# Patient Record
Sex: FEMALE | Race: WHITE | NOT HISPANIC OR LATINO | Employment: OTHER | URBAN - METROPOLITAN AREA
[De-identification: names, ages, dates, MRNs, and addresses within clinical notes are randomized per-mention and may not be internally consistent; named-entity substitution may affect disease eponyms.]

---

## 2017-06-15 ENCOUNTER — OFFICE VISIT (OUTPATIENT)
Dept: INTERNAL MEDICINE | Facility: CLINIC | Age: 67
End: 2017-06-15
Payer: COMMERCIAL

## 2017-06-15 VITALS
DIASTOLIC BLOOD PRESSURE: 84 MMHG | BODY MASS INDEX: 19.94 KG/M2 | HEIGHT: 65 IN | TEMPERATURE: 97.8 F | WEIGHT: 119.7 LBS | HEART RATE: 116 BPM | OXYGEN SATURATION: 98 % | SYSTOLIC BLOOD PRESSURE: 128 MMHG

## 2017-06-15 DIAGNOSIS — F33.0 MILD EPISODE OF RECURRENT MAJOR DEPRESSIVE DISORDER (H): ICD-10-CM

## 2017-06-15 DIAGNOSIS — Z12.11 COLON CANCER SCREENING: ICD-10-CM

## 2017-06-15 DIAGNOSIS — Z13.6 CARDIOVASCULAR SCREENING; LDL GOAL LESS THAN 130: ICD-10-CM

## 2017-06-15 DIAGNOSIS — Z23 NEED FOR PNEUMOCOCCAL VACCINATION: Primary | ICD-10-CM

## 2017-06-15 DIAGNOSIS — Z23 NEED FOR TD VACCINE: ICD-10-CM

## 2017-06-15 DIAGNOSIS — Z12.31 VISIT FOR SCREENING MAMMOGRAM: ICD-10-CM

## 2017-06-15 LAB
ALBUMIN SERPL-MCNC: 4.5 G/DL (ref 3.4–5)
ALP SERPL-CCNC: 78 U/L (ref 40–150)
ALT SERPL W P-5'-P-CCNC: 22 U/L (ref 0–50)
ANION GAP SERPL CALCULATED.3IONS-SCNC: 10 MMOL/L (ref 3–14)
AST SERPL W P-5'-P-CCNC: 19 U/L (ref 0–45)
BILIRUB SERPL-MCNC: 0.5 MG/DL (ref 0.2–1.3)
BUN SERPL-MCNC: 10 MG/DL (ref 7–30)
CALCIUM SERPL-MCNC: 9.9 MG/DL (ref 8.5–10.1)
CHLORIDE SERPL-SCNC: 105 MMOL/L (ref 94–109)
CHOLEST SERPL-MCNC: 173 MG/DL
CO2 SERPL-SCNC: 25 MMOL/L (ref 20–32)
CREAT SERPL-MCNC: 0.73 MG/DL (ref 0.52–1.04)
GFR SERPL CREATININE-BSD FRML MDRD: 80 ML/MIN/1.7M2
GLUCOSE SERPL-MCNC: 111 MG/DL (ref 70–99)
HDLC SERPL-MCNC: 76 MG/DL
HGB BLD-MCNC: 14 G/DL (ref 11.7–15.7)
LDLC SERPL CALC-MCNC: 85 MG/DL
NONHDLC SERPL-MCNC: 97 MG/DL
POTASSIUM SERPL-SCNC: 4.4 MMOL/L (ref 3.4–5.3)
PROT SERPL-MCNC: 7.5 G/DL (ref 6.8–8.8)
SODIUM SERPL-SCNC: 140 MMOL/L (ref 133–144)
TRIGL SERPL-MCNC: 62 MG/DL

## 2017-06-15 PROCEDURE — G0009 ADMIN PNEUMOCOCCAL VACCINE: HCPCS | Performed by: INTERNAL MEDICINE

## 2017-06-15 PROCEDURE — 85018 HEMOGLOBIN: CPT | Performed by: INTERNAL MEDICINE

## 2017-06-15 PROCEDURE — 90472 IMMUNIZATION ADMIN EACH ADD: CPT | Performed by: INTERNAL MEDICINE

## 2017-06-15 PROCEDURE — 36415 COLL VENOUS BLD VENIPUNCTURE: CPT | Performed by: INTERNAL MEDICINE

## 2017-06-15 PROCEDURE — 99202 OFFICE O/P NEW SF 15 MIN: CPT | Mod: 25 | Performed by: INTERNAL MEDICINE

## 2017-06-15 PROCEDURE — 90714 TD VACC NO PRESV 7 YRS+ IM: CPT | Performed by: INTERNAL MEDICINE

## 2017-06-15 PROCEDURE — 80053 COMPREHEN METABOLIC PANEL: CPT | Performed by: INTERNAL MEDICINE

## 2017-06-15 PROCEDURE — 90670 PCV13 VACCINE IM: CPT | Performed by: INTERNAL MEDICINE

## 2017-06-15 PROCEDURE — 80061 LIPID PANEL: CPT | Performed by: INTERNAL MEDICINE

## 2017-06-15 RX ORDER — CIPROFLOXACIN AND DEXAMETHASONE 3; 1 MG/ML; MG/ML
2 SUSPENSION/ DROPS AURICULAR (OTIC)
COMMUNITY
End: 2019-09-19

## 2017-06-15 NOTE — LETTER
[facility]  [address]  [address]      Mehreen 15, 2017      Faustina STANLEY Card  [address] [facility] [address]          Dear Faustina,      I have enclosed a copy of your most recent labs done here at the clinic and if available some of your prior labs for comparison.     I am pleased to inform you that your routine blood work including your hemoglobin, sodium, potassium, calcium, kidney and liver function tests are all normal.    Your cholesterol looks good and I would not change anything at this point but would repeat your labs in 12 months.    Your blood sugar function tests are slightly abnormal and elevated and should be rechecked here in the clinic in 6 months with a follow-up visit with me, fasting.  I will look forward to seeing you at that time and please call to make an appointment.  In the meantime, please work on your diet limiting your carbohydrates and getting some good, regular exercise.    Please call me if you have further questions.        Gurdeep Bardales MD

## 2017-06-15 NOTE — MR AVS SNAPSHOT
After Visit Summary   6/15/2017    Faustina Ahmadi    MRN: [id number]           Patient Information     Date Of Birth          1950        Visit Information        Provider Department      6/15/2017 1:40 PM Gurdeep Bardales MD [facility]        Today's Diagnoses     Need for pneumococcal vaccination    -  1    Need for TD vaccine        Mild episode of recurrent major depressive disorder (H)        Colon cancer screening        CARDIOVASCULAR SCREENING; LDL GOAL LESS THAN 130        Visit for screening mammogram           Follow-ups after your visit        Additional Services     GASTROENTEROLOGY ADULT REF PROCEDURE ONLY       Last Lab Result: Creatinine (mg/dL)       Date                     Value                 04/23/2005               0.65             ----------  Body mass index is 19.92 kg/(m^2).     Needed:  No  Language:  English    Patient will be contacted to schedule procedure.     Please be aware that coverage of these services is subject to the terms and limitations of your health insurance plan.  Call member services at your health plan with any benefit or coverage questions.  Any procedures must be performed at a [place] facility OR coordinated by your clinic's referral office.    Please bring the following with you to your appointment:    (1) Any X-Rays, CTs or MRIs which have been performed.  Contact the facility where they were done to arrange for  prior to your scheduled appointment.    (2) List of current medications   (3) This referral request   (4) Any documents/labs given to you for this referral                  Future tests that were ordered for you today     Open Future Orders        Priority Expected Expires Ordered    *MA Screening Digital Bilateral Routine  6/15/2018 6/15/2017    Fecal colorectal cancer screen (FIT) Routine 7/6/2017 9/7/2017 6/15/2017            Who to contact     If you have questions or need follow up  "information about today's clinic visit or your schedule please contact [facility] directly at [phone].  Normal or non-critical lab and imaging results will be communicated to you by DemystData[employer], letter or phone within 4 business days after the clinic has received the results. If you do not hear from us within 7 days, please contact the clinic through DemystData[employer] or phone. If you have a critical or abnormal lab result, we will notify you by phone as soon as possible.  Submit refill requests through [employer] or call your pharmacy and they will forward the refill request to us. Please allow 3 business days for your refill to be completed.          Additional Information About Your Visit        [employer] Information     [employer] lets you send messages to your doctor, view your test results, renew your prescriptions, schedule appointments and more. To sign up, go to [email or web address] . Click on \"Log in\" on the left side of the screen, which will take you to the Welcome page. Then click on \"Sign up Now\" on the right side of the page.     You will be asked to enter the access code listed below, as well as some personal information. Please follow the directions to create your username and password.     Your access code is: [email or web address]  Expires: 2017  1:49 PM     Your access code will  in 90 days. If you need help or a new code, please call your [place] clinic or [phone].        Care EveryWhere ID     This is your Care EveryWhere ID. This could be used by other organizations to access your [place] medical records  [id number]        Your Vitals Were     Pulse Temperature Height Pulse Oximetry Breastfeeding? BMI (Body Mass Index)    116 97.8  F (36.6  C) (Oral) 5' 5\" (1.651 m) 98% No 19.92 kg/m2       Blood Pressure from Last 3 Encounters:   06/15/17 128/84   05 132/68   05 118/70    Weight from Last 3 Encounters:   06/15/17 119 lb 11.2 oz (54.3 kg)   05 122 lb " (55.3 kg)   05 120 lb (54.4 kg)              We Performed the Following     ADMIN MEDICARE: Pneumococcal Vaccine ()     Comprehensive metabolic panel     DEPRESSION ACTION PLAN (DAP)     GASTROENTEROLOGY ADULT REF PROCEDURE ONLY     Hemoglobin     Lipid Profile     MIGRAINE ACTION PLAN     Pneumococcal vaccine 13 valent PCV13 IM (Prevnar) [93041]     TD (ADULT, 7+) PRESERVE FREE        Primary Care Provider Office Phone # Fax #    Pb Birch [phone]       XXX  XXX [address]  [facility] [address]        Thank you!     Thank you for choosing [facility]  for your care. Our goal is always to provide you with excellent care. Hearing back from our patients is one way we can continue to improve our services. Please take a few minutes to complete the written survey that you may receive in the mail after your visit with us. Thank you!             Your Updated Medication List - Protect others around you: Learn how to safely use, store and throw away your medicines at www.disposemymeds.org.          This list is accurate as of: 6/15/17  1:49 PM.  Always use your most recent med list.                   Brand Name Dispense Instructions for use    ciprofloxacin-dexamethasone otic suspension    CIPRODEX     2 drops       EXCEDRIN MIGRAINE 250-250-65 MG per tablet   Generic drug:  aspirin-acetaminophen-caffeine     0    1 TABLET EVERY 4 HOURS AS NEEDED       REMERON 15 MG tablet   Generic drug:  mirtazapine     0    take 1/2 tablet daily @ bedtime

## 2017-06-15 NOTE — NURSING NOTE
"Chief Complaint   Patient presents with     Establish Care       Initial /84  Pulse 116  Temp 97.8  F (36.6  C) (Oral)  Ht 5' 5\" (1.651 m)  Wt 119 lb 11.2 oz (54.3 kg)  SpO2 98%  Breastfeeding? No  BMI 19.92 kg/m2 Estimated body mass index is 19.92 kg/(m^2) as calculated from the following:    Height as of this encounter: 5' 5\" (1.651 m).    Weight as of this encounter: 119 lb 11.2 oz (54.3 kg).  Medication Reconciliation: complete   Yael Huizar, ANTONIETTA      "

## 2017-06-15 NOTE — PROGRESS NOTES
SUBJECTIVE:                                                    Faustina Ahmadi is a 66 year old female who presents to clinic today for the following health issues:    TD- DUE  Prevnar- DUE   Mammo- DUE  Colonoscopy- DUE    New Patient/Transfer of Care- establish care, pt not seen for routine care in 10+ yrs. No specific concerns today.     Problem list and histories reviewed & adjusted, as indicated.  Additional history: as documented    Patient Active Problem List   Diagnosis     Mild episode of recurrent major depressive disorder (H)     Other type of migraine     CARDIOVASCULAR SCREENING; LDL GOAL LESS THAN 130     Past Surgical History:   Procedure Laterality Date     NO HISTORY OF SURGERY         Social History   Substance Use Topics     Smoking status: Former Smoker     Types: Cigarettes     Smokeless tobacco: Not on file     Alcohol use No     History reviewed. No pertinent family history.      Current Outpatient Prescriptions   Medication Sig Dispense Refill     ciprofloxacin-dexamethasone (CIPRODEX) otic suspension 2 drops       REMERON 15 MG OR TABS take 1/2 tablet daily @ bedtime 0 0     EXCEDRIN MIGRAINE 250-250-65 MG OR TABS 1 TABLET EVERY 4 HOURS AS NEEDED 0 0     No lab results found.   BP Readings from Last 3 Encounters:   06/15/17 128/84   04/21/05 132/68   04/20/05 118/70    Wt Readings from Last 3 Encounters:   06/15/17 119 lb 11.2 oz (54.3 kg)   04/21/05 122 lb (55.3 kg)   04/20/05 120 lb (54.4 kg)               Reviewed and updated as needed this visit by clinical staff  Tobacco  Allergies  Meds  Med Hx  Surg Hx  Fam Hx  Soc Hx      Reviewed and updated as needed this visit by Provider       ROS:  C: NEGATIVE for fever, chills, change in weight  E/M: NEGATIVE for ear, mouth and throat problems  R: NEGATIVE for significant cough or SOB  CV: NEGATIVE for chest pain, palpitations or peripheral edema  GI: NEGATIVE for nausea, abdominal pain, heartburn, or change in bowel habits  : NEGATIVE  "for frequency, dysuria, or hematuria  H: NEGATIVE for bleeding problems    OBJECTIVE:                                                    /84  Pulse 116  Temp 97.8  F (36.6  C) (Oral)  Ht 5' 5\" (1.651 m)  Wt 119 lb 11.2 oz (54.3 kg)  SpO2 98%  Breastfeeding? No  BMI 19.92 kg/m2  Body mass index is 19.92 kg/(m^2).  Mild Kot[place]  Mild Tic upper torso.       ASSESSMENT/PLAN:                                                    (Z23) Need for pneumococcal vaccination  (primary encounter diagnosis)  Comment: suggest update  Plan: Pneumococcal vaccine 13 valent PCV13 IM         (Prevnar) [68873], ADMIN MEDICARE: Pneumococcal        Vaccine ()            (Z23) Need for TD vaccine  Comment: suggest updated  Plan: TD (ADULT, 7+) PRESERVE FREE            (F33.0) Mild episode of recurrent major depressive disorder (H)  Comment: stable on therapy pr PHQ 9  Plan: Hemoglobin            (Z12.11) Colon cancer screening  Comment: ADVISED COLONOSCOPY FOR ROUTINE PREVENTATIVE CARE.  Plan: GASTROENTEROLOGY ADULT REF PROCEDURE ONLY,         Fecal colorectal cancer screen (FIT)            (Z13.6) CARDIOVASCULAR SCREENING; LDL GOAL LESS THAN 130  Comment: labs as fasting  Plan: Comprehensive metabolic panel, Lipid Profile            (Z12.31) Visit for screening mammogram  Comment: ordered as screening  Plan: *MA Screening Digital Bilateral            See Patient Instructions    Gurdeep Bardales MD  [facility]    THE MEDICATION LIST HAS BEEN FULLY RECONCILED BY THE M.D. AND THE NURSING STAFF.      "

## 2017-06-15 NOTE — LETTER
My Depression Action Plan  Name: Faustina Ahmadi   Date of Birth 1950  Date: 6/15/2017    My doctor: Pb Birch   My clinic: [address] [address] [phone]          GREEN    ZONE   Good Control    What it looks like:     Things are going generally well. You have normal up s and down s. You may even feel depressed from time to time, but bad moods usually last less than a day.   What you need to do:  1. Continue to care for yourself (see self care plan)  2. Check your depression survival kit and update it as needed  3. Follow your physician s recommendations including any medication.  4. Do not stop taking medication unless you consult with your physician first.           YELLOW         ZONE Getting Worse    What it looks like:     Depression is starting to interfere with your life.     It may be hard to get out of bed; you may be starting to isolate yourself from others.    Symptoms of depression are starting to last most all day and this has happened for several days.     You may have suicidal thoughts but they are not constant.   What you need to do:     1. Call your care team, your response to treatment will improve if you keep your care team informed of your progress. Yellow periods are signs an adjustment may need to be made.     2. Continue your self-care, even if you have to fake it!    3. Talk to someone in your support network    4. Open up your depression survival kit           RED    ZONE Medical Alert - Get Help    What it looks like:     Depression is seriously interfering with your life.     You may experience these or other symptoms: You can t get out of bed most days, can t work or engage in other necessary activities, you have trouble taking care of basic hygiene, or basic responsibilities, thoughts of suicide or death that will not go away, self-injurious behavior.     What you need to do:  1. Call your  care team and request a same-day appointment. If they are not available (weekends or after hours) call your local crisis line, emergency room or 911.      Electronically signed by: Yael Huizar, Mehreen 15, 2017    Depression Self Care Plan / Survival Kit    Self-Care for Depression  Here s the deal. Your body and mind are really not as separate as most people think.  What you do and think affects how you feel and how you feel influences what you do and think. This means if you do things that people who feel good do, it will help you feel better.  Sometimes this is all it takes.  There is also a place for medication and therapy depending on how severe your depression is, so be sure to consult with your medical provider and/ or Behavioral Health Consultant if your symptoms are worsening or not improving.     In order to better manage my stress, I will:    Exercise  Get some form of exercise, every day. This will help reduce pain and release endorphins, the  feel good  chemicals in your brain. This is almost as good as taking antidepressants!  This is not the same as joining a gym and then never going! (they count on that by the way ) It can be as simple as just going for a walk or doing some gardening, anything that will get you moving.      Hygiene   Maintain good hygiene (Get out of bed in the morning, Make your bed, Brush your teeth, Take a shower, and Get dressed like you were going to work, even if you are unemployed).  If your clothes don't fit try to get ones that do.    Diet  I will strive to eat foods that are good for me, drink plenty of water, and avoid excessive sugar, caffeine, alcohol, and other mood-altering substances.  Some foods that are helpful in depression are: complex carbohydrates, B vitamins, flaxseed, fish or fish oil, fresh fruits and vegetables.    Psychotherapy  I agree to participate in Individual Therapy (if recommended).    Medication  If prescribed medications, I agree to take them.   Missing doses can result in serious side effects.  I understand that drinking alcohol, or other illicit drug use, may cause potential side effects.  I will not stop my medication abruptly without first discussing it with my provider.    Staying Connected With Others  I will stay in touch with my friends, family members, and my primary care provider/team.    Use your imagination  Be creative.  We all have a creative side; it doesn t matter if it s oil painting, sand castles, or mud pies! This will also kick up the endorphins.    Witness Beauty  (AKA stop and smell the roses) Take a look outside, even in mid-winter. Notice colors, textures. Watch the squirrels and birds.     Service to others  Be of service to others.  There is always someone else in need.  By helping others we can  get out of ourselves  and remember the really important things.  This also provides opportunities for practicing all the other parts of the program.    Humor  Laugh and be silly!  Adjust your TV habits for less news and crime-drama and more comedy.    Control your stress  Try breathing deep, massage therapy, biofeedback, and meditation. Find time to relax each day.     My support system    Clinic Contact:  Phone number:    Contact 1:  Phone number:    Contact 2:  Phone number:    Oriental orthodox/:  Phone number:    Therapist:  Phone number:    Local crisis center:    Phone number:    Other community support:  Phone number:

## 2017-06-16 ASSESSMENT — PATIENT HEALTH QUESTIONNAIRE - PHQ9: SUM OF ALL RESPONSES TO PHQ QUESTIONS 1-9: 0

## 2017-06-17 DIAGNOSIS — Z12.11 COLON CANCER SCREENING: ICD-10-CM

## 2017-06-17 PROCEDURE — 82274 ASSAY TEST FOR BLOOD FECAL: CPT | Performed by: INTERNAL MEDICINE

## 2017-06-19 LAB — HEMOCCULT STL QL IA: POSITIVE

## 2017-06-27 ENCOUNTER — OFFICE VISIT (OUTPATIENT)
Dept: INTERNAL MEDICINE | Facility: CLINIC | Age: 67
End: 2017-06-27
Payer: COMMERCIAL

## 2017-06-27 VITALS
DIASTOLIC BLOOD PRESSURE: 82 MMHG | SYSTOLIC BLOOD PRESSURE: 144 MMHG | HEART RATE: 99 BPM | TEMPERATURE: 98.3 F | WEIGHT: 120.9 LBS | BODY MASS INDEX: 20.12 KG/M2 | OXYGEN SATURATION: 97 %

## 2017-06-27 DIAGNOSIS — R19.5 OCCULT BLOOD IN STOOLS: Primary | ICD-10-CM

## 2017-06-27 DIAGNOSIS — Z12.11 COLON CANCER SCREENING: ICD-10-CM

## 2017-06-27 DIAGNOSIS — Z71.89 COUNSELING REGARDING ADVANCED DIRECTIVES: ICD-10-CM

## 2017-06-27 PROCEDURE — 99213 OFFICE O/P EST LOW 20 MIN: CPT | Performed by: INTERNAL MEDICINE

## 2017-06-27 NOTE — MR AVS SNAPSHOT
After Visit Summary   6/27/2017    Faustina Ahmadi    MRN: [id number]           Patient Information     Date Of Birth          1950        Visit Information        Provider Department      6/27/2017 2:40 PM Gurdeep Bardales MD [facility]        Today's Diagnoses     Occult blood in stools    -  1    Counseling regarding advanced directives        Colon cancer screening           Follow-ups after your visit        Follow-up notes from your care team     Return if symptoms worsen or fail to improve.      Future tests that were ordered for you today     Open Future Orders        Priority Expected Expires Ordered    Fecal colorectal cancer screen (FIT) Routine 7/18/2017 9/19/2017 6/27/2017            Who to contact     If you have questions or need follow up information about today's clinic visit or your schedule please contact [facility] directly at [phone].  Normal or non-critical lab and imaging results will be communicated to you by Eduf[employer]hart, letter or phone within 4 business days after the clinic has received the results. If you do not hear from us within 7 days, please contact the clinic through Edufiihart or phone. If you have a critical or abnormal lab result, we will notify you by phone as soon as possible.  Submit refill requests through [employer] or call your pharmacy and they will forward the refill request to us. Please allow 3 business days for your refill to be completed.          Additional Information About Your Visit        MyChart Information     [employer] gives you secure access to your electronic health record. If you see a primary care provider, you can also send messages to your care team and make appointments. If you have questions, please call your primary care clinic.  If you do not have a primary care provider, please call [phone] and they will assist you.        Care EveryWhere ID     This is your Care EveryWhere ID. This  could be used by other organizations to access your [place] medical records  [id number]        Your Vitals Were     Pulse Temperature Pulse Oximetry BMI (Body Mass Index)          99 98.3  F (36.8  C) (Oral) 97% 20.12 kg/m2         Blood Pressure from Last 3 Encounters:   17 144/82   06/15/17 128/84   05 132/68    Weight from Last 3 Encounters:   17 120 lb 14.4 oz (54.8 kg)   06/15/17 119 lb 11.2 oz (54.3 kg)   05 122 lb (55.3 kg)               Primary Care Provider Office Phone # Fax #    Pb Birch [phone]       XXX  XXX [address]  [facility] [address]        Equal Access to Services     CASSIDY LU : Hadii aad ku hadasho Soomaali, waaxda luqadaha, qaybta kaalmada adeegyada, nishi reinoso hayellen askew . So [facility] [phone].    ATENCIÓN: Si habla español, tiene a guo disposición servicios gratuitos de asistencia lingüística. Narayan al [phone].    We comply with applicable federal civil rights laws and [place] laws. We do not discriminate on the basis of race, color, national origin, age, disability sex, sexual orientation or gender identity.            Thank you!     Thank you for choosing [facility]  for your care. Our goal is always to provide you with excellent care. Hearing back from our patients is one way we can continue to improve our services. Please take a few minutes to complete the written survey that you may receive in the mail after your visit with us. Thank you!             Your Updated Medication List - Protect others around you: Learn how to safely use, store and throw away your medicines at www.disposemymeds.org.          This list is accurate as of: 17  3:10 PM.  Always use your most recent med list.                   Brand Name Dispense Instructions for use Diagnosis    ciprofloxacin-dexamethasone otic suspension    CIPRODEX     2 drops        EXCEDRIN MIGRAINE 250-250-65 MG  per tablet   Generic drug:  aspirin-acetaminophen-caffeine     0    1 TABLET EVERY 4 HOURS AS NEEDED    Other forms of migraine, without mention of intractable migraine without mention of status migrainosus       REMERON 15 MG tablet   Generic drug:  mirtazapine     0    take 1/2 tablet daily @ bedtime    Depressive disorder, not elsewhere classified

## 2017-06-27 NOTE — PROGRESS NOTES
SUBJECTIVE:                                                    Faustina Ahmadi is a 66 year old female who presents to clinic today for the following health issues:    Here for discussion of recent labs done + occult stool:  Component      Latest Ref Rng & Units 6/15/2017 6/17/2017   Sodium      133 - 144 mmol/L 140    Potassium      3.4 - 5.3 mmol/L 4.4    Chloride      94 - 109 mmol/L 105    Carbon Dioxide      20 - 32 mmol/L 25    Anion Gap      3 - 14 mmol/L 10    Glucose      70 - 99 mg/dL 111 (H)    Urea Nitrogen      7 - 30 mg/dL 10    Creatinine      0.52 - 1.04 mg/dL 0.73    GFR Estimate      >60 mL/min/1.7m2 80    GFR Estimate If Black      >60 mL/min/1.7m2 >90 . . .    Calcium      8.5 - 10.1 mg/dL 9.9    Bilirubin Total      0.2 - 1.3 mg/dL 0.5    Albumin      3.4 - 5.0 g/dL 4.5    Protein Total      6.8 - 8.8 g/dL 7.5    Alkaline Phosphatase      40 - 150 U/L 78    ALT      0 - 50 U/L 22    AST      0 - 45 U/L 19    Cholesterol      <200 mg/dL 173    Triglycerides      <150 mg/dL 62    HDL Cholesterol      >49 mg/dL 76    LDL Cholesterol Calculated      <100 mg/dL 85    Non HDL Cholesterol      <130 mg/dL 97    Hemoglobin      11.7 - 15.7 g/dL 14.0    Occult Blood Scn FIT      NEG  Positive (A)         Problem list and histories reviewed & adjusted, as indicated.  Additional history: as documented    Patient Active Problem List   Diagnosis     Mild episode of recurrent major depressive disorder (H)     Other type of migraine     CARDIOVASCULAR SCREENING; LDL GOAL LESS THAN 130     Past Surgical History:   Procedure Laterality Date     NO HISTORY OF SURGERY         Social History   Substance Use Topics     Smoking status: Former Smoker     Types: Cigarettes     Smokeless tobacco: Not on file     Alcohol use No     No family history on file.      Current Outpatient Prescriptions   Medication Sig Dispense Refill     ciprofloxacin-dexamethasone (CIPRODEX) otic suspension 2 drops       REMERON 15 MG OR TABS  take 1/2 tablet daily @ bedtime 0 0     EXCEDRIN MIGRAINE 250-250-65 MG OR TABS 1 TABLET EVERY 4 HOURS AS NEEDED 0 0     Allergies   Allergen Reactions     Honey Rash     BP Readings from Last 3 Encounters:   06/15/17 128/84   04/21/05 132/68   04/20/05 118/70    Wt Readings from Last 3 Encounters:   06/15/17 119 lb 11.2 oz (54.3 kg)   04/21/05 122 lb (55.3 kg)   04/20/05 120 lb (54.4 kg)         Reviewed and updated as needed this visit by clinical staff       Reviewed and updated as needed this visit by Provider       ROS:  C: NEGATIVE for fever, chills, change in weight  E/M: NEGATIVE for ear, mouth and throat problems  R: NEGATIVE for significant cough or SOB  CV: NEGATIVE for chest pain, palpitations or peripheral edema  GI: NEGATIVE for nausea, abdominal pain, heartburn, or change in bowel habits  : NEGATIVE for frequency, dysuria, or hematuria  P: NEGATIVE for changes in mood or affect    OBJECTIVE:                                                    /82  Pulse 99  Temp 98.3  F (36.8  C) (Oral)  Wt 120 lb 14.4 oz (54.8 kg)  SpO2 97%  BMI 20.12 kg/m2  Body mass index is 20.12 kg/(m^2).  GENERAL: alert and no distress  RESP: lungs clear to auscultation - no rales, no rhonchi, no wheezes  CV: regular rates and rhythm, normal S1 S2, no S3 or S4 and no murmur, no click or rub -  PSYCH: Alert and oriented times 3; speech- coherent , normal rate and volume; able to articulate logical thoughts, able to abstract reason, no tangential thoughts, no hallucinations or delusions, affect- normal     ASSESSMENT/PLAN:                                                      (R19.5) Occult blood in stools  (primary encounter diagnosis)  Comment: as ordered  Plan: GASTROENTEROLOGY ADULT REF PROCEDURE ONLY for colonoscopy ordered, if negative repeat FIT, consider UGI assessment.          (Z71.89) Counseling regarding advanced directives  Comment: as ordered  Plan:       See Patient Instructions    Gurdeep Bardales,  MD  [facility]

## 2017-06-27 NOTE — NURSING NOTE
"Chief Complaint   Patient presents with     Occult Specimen       Initial /82  Pulse 99  Temp 98.3  F (36.8  C) (Oral)  Wt 120 lb 14.4 oz (54.8 kg)  SpO2 97%  BMI 20.12 kg/m2 Estimated body mass index is 20.12 kg/(m^2) as calculated from the following:    Height as of 6/15/17: 5' 5\" (1.651 m).    Weight as of this encounter: 120 lb 14.4 oz (54.8 kg).  Medication Reconciliation: complete   Yael Huizar CMA      "

## 2017-07-25 ENCOUNTER — TRANSFERRED RECORDS (OUTPATIENT)
Dept: HEALTH INFORMATION MANAGEMENT | Facility: CLINIC | Age: 67
End: 2017-07-25

## 2017-08-22 ENCOUNTER — TELEPHONE (OUTPATIENT)
Dept: INTERNAL MEDICINE | Facility: CLINIC | Age: 67
End: 2017-08-22

## 2017-08-22 NOTE — TELEPHONE ENCOUNTER
8/22/2017    Call Regarding Preventive Health Screening VIP Mammogram    Attempt 1    Message on voicemail     Comments: VIP Mammogram Patient      Outreach   NESTOR

## 2017-09-05 ENCOUNTER — RADIANT APPOINTMENT (OUTPATIENT)
Dept: MAMMOGRAPHY | Facility: CLINIC | Age: 67
End: 2017-09-05
Payer: COMMERCIAL

## 2017-09-05 DIAGNOSIS — Z12.31 VISIT FOR SCREENING MAMMOGRAM: ICD-10-CM

## 2017-09-05 PROCEDURE — G0202 SCR MAMMO BI INCL CAD: HCPCS | Mod: TC

## 2017-12-14 ENCOUNTER — OFFICE VISIT (OUTPATIENT)
Dept: INTERNAL MEDICINE | Facility: CLINIC | Age: 67
End: 2017-12-14
Payer: COMMERCIAL

## 2017-12-14 VITALS
TEMPERATURE: 98.5 F | OXYGEN SATURATION: 94 % | DIASTOLIC BLOOD PRESSURE: 80 MMHG | SYSTOLIC BLOOD PRESSURE: 136 MMHG | BODY MASS INDEX: 19.3 KG/M2 | HEART RATE: 133 BPM | WEIGHT: 116 LBS

## 2017-12-14 DIAGNOSIS — R73.9 HYPERGLYCEMIA: Primary | ICD-10-CM

## 2017-12-14 DIAGNOSIS — F33.0 MILD EPISODE OF RECURRENT MAJOR DEPRESSIVE DISORDER (H): ICD-10-CM

## 2017-12-14 LAB
ANION GAP SERPL CALCULATED.3IONS-SCNC: 9 MMOL/L (ref 3–14)
BUN SERPL-MCNC: 14 MG/DL (ref 7–30)
CALCIUM SERPL-MCNC: 9.7 MG/DL (ref 8.5–10.1)
CHLORIDE SERPL-SCNC: 107 MMOL/L (ref 94–109)
CO2 SERPL-SCNC: 24 MMOL/L (ref 20–32)
CREAT SERPL-MCNC: 0.75 MG/DL (ref 0.52–1.04)
GFR SERPL CREATININE-BSD FRML MDRD: 77 ML/MIN/1.7M2
GLUCOSE SERPL-MCNC: 111 MG/DL (ref 70–99)
POTASSIUM SERPL-SCNC: 4.6 MMOL/L (ref 3.4–5.3)
SODIUM SERPL-SCNC: 140 MMOL/L (ref 133–144)

## 2017-12-14 PROCEDURE — 36415 COLL VENOUS BLD VENIPUNCTURE: CPT | Performed by: INTERNAL MEDICINE

## 2017-12-14 PROCEDURE — 80048 BASIC METABOLIC PNL TOTAL CA: CPT | Performed by: INTERNAL MEDICINE

## 2017-12-14 PROCEDURE — 99213 OFFICE O/P EST LOW 20 MIN: CPT | Performed by: INTERNAL MEDICINE

## 2017-12-14 RX ORDER — MIRTAZAPINE 15 MG/1
TABLET, FILM COATED ORAL
Qty: 45 TABLET | Refills: 3
Start: 2017-12-14

## 2017-12-14 ASSESSMENT — PATIENT HEALTH QUESTIONNAIRE - PHQ9: SUM OF ALL RESPONSES TO PHQ QUESTIONS 1-9: 2

## 2017-12-14 NOTE — NURSING NOTE
"Chief Complaint   Patient presents with     Glucose     Occult Specimen       Initial /80  Pulse 133  Temp 98.5  F (36.9  C) (Oral)  Wt 116 lb (52.6 kg)  SpO2 94%  BMI 19.3 kg/m2 Estimated body mass index is 19.3 kg/(m^2) as calculated from the following:    Height as of 6/15/17: 5' 5\" (1.651 m).    Weight as of this encounter: 116 lb (52.6 kg).  Medication Reconciliation: complete   Yael Huizar CMA      "

## 2017-12-14 NOTE — MR AVS SNAPSHOT
After Visit Summary   12/14/2017    Faustina Ahmadi    MRN: [id number]           Patient Information     Date Of Birth          1950        Visit Information        Provider Department      12/14/2017 2:20 PM Gurdeep Bardales MD [facility]        Today's Diagnoses     Hyperglycemia    -  1    Mild episode of recurrent major depressive disorder (H)           Follow-ups after your visit        Follow-up notes from your care team     Return if symptoms worsen or fail to improve.      Who to contact     If you have questions or need follow up information about today's clinic visit or your schedule please contact [facility] directly at [phone].  Normal or non-critical lab and imaging results will be communicated to you by MyChart, letter or phone within 4 business days after the clinic has received the results. If you do not hear from us within 7 days, please contact the clinic through [employer] or phone. If you have a critical or abnormal lab result, we will notify you by phone as soon as possible.  Submit refill requests through [facility] or call your pharmacy and they will forward the refill request to us. Please allow 3 business days for your refill to be completed.          Additional Information About Your Visit        MyChart Information     [facility] gives you secure access to your electronic health record. If you see a primary care provider, you can also send messages to your care team and make appointments. If you have questions, please call your primary care clinic.  If you do not have a primary care provider, please call [phone] and they will assist you.        Care EveryWhere ID     This is your Care EveryWhere ID. This could be used by other organizations to access your [place] medical records  [id number]        Your Vitals Were     Pulse Temperature Pulse Oximetry BMI (Body Mass Index)          133 98.5  F (36.9  C) (Oral) 94% 19.3  kg/m2         Blood Pressure from Last 3 Encounters:   17 136/80   17 144/82   06/15/17 128/84    Weight from Last 3 Encounters:   17 116 lb (52.6 kg)   17 120 lb 14.4 oz (54.8 kg)   06/15/17 119 lb 11.2 oz (54.3 kg)              We Performed the Following     Basic metabolic panel          Today's Medication Changes          These changes are accurate as of: 17  2:45 PM.  If you have any questions, ask your nurse or doctor.               These medicines have changed or have updated prescriptions.        Dose/Directions    mirtazapine 15 MG tablet   Commonly known as:  REMERON   This may have changed:  See the new instructions.   Used for:  Mild episode of recurrent major depressive disorder (H)   Changed by:  Gurdeep Bardales MD        take 1/2 tablet daily @ bedtime   Quantity:  45 tablet   Refills:  3            Where to get your medicines      Some of these will need a paper prescription and others can be bought over the counter.  Ask your nurse if you have questions.     You don't need a prescription for these medications     mirtazapine 15 MG tablet                Primary Care Provider Office Phone # Fax #    Pb Birch,  [phone]       XXX  XXX [address]  [facility] [address]        Equal Access to Services     CASSIDY LU AH: Hadii aad ku hadasho Soomaali, waaxda luqadaha, qaybta kaalmada adeegyada, waxay idiin hayaan nelson youssef. So [facility] [phone].    ATENCIÓN: Si habla español, tiene a guo disposición servicios gratuitos de asistencia lingüística. Llame al [phone].    We comply with applicable federal civil rights laws and [place] laws. We do not discriminate on the basis of race, color, national origin, age, disability, sex, sexual orientation, or gender identity.            Thank you!     Thank you for choosing [facility]  for your care. Our goal is always to provide you with excellent  care. Hearing back from our patients is one way we can continue to improve our services. Please take a few minutes to complete the written survey that you may receive in the mail after your visit with us. Thank you!             Your Updated Medication List - Protect others around you: Learn how to safely use, store and throw away your medicines at www.disposemymeds.org.          This list is accurate as of: 12/14/17  2:45 PM.  Always use your most recent med list.                   Brand Name Dispense Instructions for use Diagnosis    ciprofloxacin-dexamethasone otic suspension    CIPRODEX     2 drops        EXCEDRIN MIGRAINE 250-250-65 MG per tablet   Generic drug:  aspirin-acetaminophen-caffeine     0    1 TABLET EVERY 4 HOURS AS NEEDED    Other forms of migraine, without mention of intractable migraine without mention of status migrainosus       mirtazapine 15 MG tablet    REMERON    45 tablet    take 1/2 tablet daily @ bedtime    Mild episode of recurrent major depressive disorder (H)

## 2017-12-14 NOTE — PROGRESS NOTES
SUBJECTIVE:   Faustina Ahmadi is a 67 year old female who presents to clinic today for the following health issues:    Follow up elevated glucose from 6/15/17 and positive FIT discussed as per prior colonoscopy.    Problem list and histories reviewed & adjusted, as indicated.  Additional history: as documented    Patient Active Problem List   Diagnosis     Mild episode of recurrent major depressive disorder (H)     Other type of migraine     CARDIOVASCULAR SCREENING; LDL GOAL LESS THAN 130     Counseling regarding advanced directives     Past Surgical History:   Procedure Laterality Date     NO HISTORY OF SURGERY         Social History   Substance Use Topics     Smoking status: Former Smoker     Types: Cigarettes     Smokeless tobacco: Never Used     Alcohol use No     History reviewed. No pertinent family history.      Current Outpatient Prescriptions   Medication Sig Dispense Refill     ciprofloxacin-dexamethasone (CIPRODEX) otic suspension 2 drops       REMERON 15 MG OR TABS take 1/2 tablet daily @ bedtime 0 0     EXCEDRIN MIGRAINE 250-250-65 MG OR TABS 1 TABLET EVERY 4 HOURS AS NEEDED 0 0     Allergies   Allergen Reactions     Honey Rash     BP Readings from Last 3 Encounters:   12/14/17 136/80   06/27/17 144/82   06/15/17 128/84    Wt Readings from Last 3 Encounters:   12/14/17 116 lb (52.6 kg)   06/27/17 120 lb 14.4 oz (54.8 kg)   06/15/17 119 lb 11.2 oz (54.3 kg)                 Reviewed and updated as needed this visit by clinical staffTobacco  Allergies  Med Hx  Surg Hx  Fam Hx  Soc Hx      Reviewed and updated as needed this visit by Provider         ROS:  C: NEGATIVE for fever, chills, change in weight  I: NEGATIVE for worrisome rashes, moles or lesions  E: NEGATIVE for vision changes or irritation  E/M: NEGATIVE for ear, mouth and throat problems  R: NEGATIVE for significant cough or SOB  B: NEGATIVE for masses, tenderness or discharge  CV: NEGATIVE for chest pain, palpitations or peripheral  edema  GI: NEGATIVE for nausea, abdominal pain, heartburn, or change in bowel habits  : NEGATIVE for frequency, dysuria, or hematuria  M: NEGATIVE for significant arthralgias or myalgia  N: NEGATIVE for weakness, dizziness or paresthesias  E: NEGATIVE for temperature intolerance, skin/hair changes  H: NEGATIVE for bleeding problems    OBJECTIVE:                                                    /80  Pulse 133  Temp 98.5  F (36.9  C) (Oral)  Wt 116 lb (52.6 kg)  SpO2 94%  BMI 19.3 kg/m2  Body mass index is 19.3 kg/(m^2).  GENERAL: healthy, alert and no distress  EYES: Eyes grossly normal to inspection, extraocular movements - intact, and PERRL  HENT: ear canals- normal; TMs- normal; Nose- normal; Mouth- no ulcers, no lesions  NECK: no tenderness, no adenopathy, no asymmetry, no masses, no stiffness; thyroid- normal to palpation  RESP: lungs clear to auscultation - no rales, no rhonchi, no wheezes  CV: regular rates and rhythm, normal S1 S2, no S3 or S4 and no murmur, no click or rub -  MS: extremities- no gross deformities noted, no edema  PSYCH: Alert and oriented times 3; speech- coherent , normal rate and volume; able to articulate logical thoughts, able to abstract reason, no tangential thoughts, no hallucinations or delusions, affect- normal       ASSESSMENT/PLAN:                                                      (R73.9) Hyperglycemia  (primary encounter diagnosis)  Comment: repeat fasting levels  Plan: Basic metabolic panel            (F33.0) Mild episode of recurrent major depressive disorder (H)  Comment: stable per PHQ 9 done score 2  Plan: mirtazapine (REMERON) 15 MG tablet          See Patient Instructions    Gurdeep Bardales MD  [facility]    THE MEDICATION LIST HAS BEEN FULLY RECONCILED BY THE M.D. AND THE NURSING STAFF.

## 2017-12-14 NOTE — LETTER
[facility][1]  [address] [address]  [phone]      12/14/2017       Faustina Ahmadi  [address] [facility] [address]        Dear Faustina,    Your blood sugar function tests are slightly abnormal and elevated and should be rechecked here in the clinic in 3 months with a follow-up visit with me, fasting.  I will look forward to seeing you at that time and please call to make an appointment.  In the meantime, please work on your diet limiting your carbohydrates and getting some good, regular exercise.    Sincerely,      Gurdeep Bardales MD  Internal Medicine

## 2018-03-14 ENCOUNTER — OFFICE VISIT (OUTPATIENT)
Dept: INTERNAL MEDICINE | Facility: CLINIC | Age: 68
End: 2018-03-14
Payer: COMMERCIAL

## 2018-03-14 VITALS
HEIGHT: 65 IN | WEIGHT: 115.6 LBS | SYSTOLIC BLOOD PRESSURE: 130 MMHG | RESPIRATION RATE: 15 BRPM | BODY MASS INDEX: 19.26 KG/M2 | DIASTOLIC BLOOD PRESSURE: 80 MMHG | TEMPERATURE: 97.6 F | HEART RATE: 106 BPM | OXYGEN SATURATION: 95 %

## 2018-03-14 DIAGNOSIS — R73.9 HYPERGLYCEMIA: ICD-10-CM

## 2018-03-14 DIAGNOSIS — Z78.0 ASYMPTOMATIC POSTMENOPAUSAL STATUS: Primary | ICD-10-CM

## 2018-03-14 DIAGNOSIS — R21 RASH: ICD-10-CM

## 2018-03-14 LAB
ANION GAP SERPL CALCULATED.3IONS-SCNC: 9 MMOL/L (ref 3–14)
BUN SERPL-MCNC: 15 MG/DL (ref 7–30)
CALCIUM SERPL-MCNC: 9.3 MG/DL (ref 8.5–10.1)
CHLORIDE SERPL-SCNC: 102 MMOL/L (ref 94–109)
CO2 SERPL-SCNC: 25 MMOL/L (ref 20–32)
CREAT SERPL-MCNC: 0.68 MG/DL (ref 0.52–1.04)
GFR SERPL CREATININE-BSD FRML MDRD: 87 ML/MIN/1.7M2
GLUCOSE SERPL-MCNC: 97 MG/DL (ref 70–99)
POTASSIUM SERPL-SCNC: 4 MMOL/L (ref 3.4–5.3)
SODIUM SERPL-SCNC: 136 MMOL/L (ref 133–144)

## 2018-03-14 PROCEDURE — 36415 COLL VENOUS BLD VENIPUNCTURE: CPT | Performed by: INTERNAL MEDICINE

## 2018-03-14 PROCEDURE — 99213 OFFICE O/P EST LOW 20 MIN: CPT | Performed by: INTERNAL MEDICINE

## 2018-03-14 PROCEDURE — 80048 BASIC METABOLIC PNL TOTAL CA: CPT | Performed by: INTERNAL MEDICINE

## 2018-03-14 RX ORDER — TACROLIMUS 1 MG/G
OINTMENT TOPICAL 2 TIMES DAILY
Qty: 30 G | Refills: 1 | Status: SHIPPED | OUTPATIENT
Start: 2018-03-14 | End: 2023-04-11

## 2018-03-14 RX ORDER — CLOBETASOL PROPIONATE 0.5 MG/G
OINTMENT TOPICAL 2 TIMES DAILY
Qty: 45 G | Refills: 1 | Status: SHIPPED | OUTPATIENT
Start: 2018-03-14 | End: 2019-09-19

## 2018-03-14 ASSESSMENT — PAIN SCALES - GENERAL: PAINLEVEL: NO PAIN (0)

## 2018-03-14 NOTE — NURSING NOTE
"Chief Complaint   Patient presents with     Glucose       Initial /80  Pulse 106  Temp 97.6  F (36.4  C) (Oral)  Resp 15  Ht 5' 5\" (1.651 m)  Wt 115 lb 9.6 oz (52.4 kg)  SpO2 91%  BMI 19.24 kg/m2 Estimated body mass index is 19.24 kg/(m^2) as calculated from the following:    Height as of this encounter: 5' 5\" (1.651 m).    Weight as of this encounter: 115 lb 9.6 oz (52.4 kg).  Medication Reconciliation: complete   Yael Huizar CMA      "

## 2018-03-14 NOTE — PROGRESS NOTES
SUBJECTIVE:   Faustina Ahmadi is a 67 year old female who presents to clinic today for the following health issues:    Recheck glucose from 12/14/18    Patient is also requesting refills of 2 topical medications she has received from dermatology and Dr. MOURA.  She has been using these on a as needed basis.    Problem list and histories reviewed & adjusted, as indicated.  Additional history: as documented    Patient Active Problem List   Diagnosis     Mild episode of recurrent major depressive disorder (H)     Other type of migraine     CARDIOVASCULAR SCREENING; LDL GOAL LESS THAN 130     Counseling regarding advanced directives     Past Surgical History:   Procedure Laterality Date     NO HISTORY OF SURGERY         Social History   Substance Use Topics     Smoking status: Former Smoker     Types: Cigarettes     Smokeless tobacco: Never Used     Alcohol use No     No family history on file.      Current Outpatient Prescriptions   Medication Sig Dispense Refill     mirtazapine (REMERON) 15 MG tablet take 1/2 tablet daily @ bedtime 45 tablet 3     ciprofloxacin-dexamethasone (CIPRODEX) otic suspension 2 drops       EXCEDRIN MIGRAINE 250-250-65 MG OR TABS 1 TABLET EVERY 4 HOURS AS NEEDED 0 0     Allergies   Allergen Reactions     Honey Rash     BP Readings from Last 3 Encounters:   12/14/17 136/80   06/27/17 144/82   06/15/17 128/84    Wt Readings from Last 3 Encounters:   12/14/17 116 lb (52.6 kg)   06/27/17 120 lb 14.4 oz (54.8 kg)   06/15/17 119 lb 11.2 oz (54.3 kg)                    Reviewed and updated as needed this visit by clinical staff       Reviewed and updated as needed this visit by Provider         ROS:  CONSTITUTIONAL: NEGATIVE for fever, chills, change in weight  INTEGUMENTARY/SKIN: NEGATIVE for worrisome rashes, moles or lesions  ENT/MOUTH: NEGATIVE for ear, mouth and throat problems  RESP: NEGATIVE for significant cough or SOB  CV: NEGATIVE for chest pain, palpitations or peripheral edema  GI:  "NEGATIVE for nausea, abdominal pain, heartburn, or change in bowel habits  : NEGATIVE for frequency, dysuria, or hematuria  MUSCULOSKELETAL: NEGATIVE for significant arthralgias or myalgia  HEME: NEGATIVE for bleeding problems  PSYCHIATRIC: NEGATIVE for changes in mood or affect    OBJECTIVE:                                                    /80  Pulse 106  Temp 97.6  F (36.4  C) (Oral)  Resp 15  Ht 5' 5\" (1.651 m)  Wt 115 lb 9.6 oz (52.4 kg)  SpO2 95%  BMI 19.24 kg/m2  Body mass index is 19.24 kg/(m^2).  GENERAL: healthy, alert and no distress  RESP: lungs clear to auscultation - no rales, no rhonchi, no wheezes  CV: regular rates and rhythm, normal S1 S2, no S3 or S4 and no click or rub   MS: extremities- no gross deformities noted  PSYCH: Alert and oriented times 3; speech- coherent , normal rate and volume; able to articulate logical thoughts, able to abstract reason, no tangential thoughts, no hallucinations or delusions, affect- normal       ASSESSMENT/PLAN:                                                      (Z78.0) Asymptomatic postmenopausal status  (primary encounter diagnosis)  Comment: Just at baseline assessment for bone density scan per age.  Plan: DX Hip/Pelvis/Spine            (R21) Rash  Comment: Refilled for as needed use  Plan: clobetasol (TEMOVATE) 0.05 % ointment,         tacrolimus (PROTOPIC) 0.1 % ointment            (R73.9) Hyperglycemia  Comment: Order also done for repeat basic metabolic panel due to fasting blood sugar slightly elevated per last check.  Plan: **Basic metabolic panel FUTURE anytime              See Patient Instructions    Gurdeep Bardales MD  [facility] [place]    THE MEDICATION LIST HAS BEEN FULLY RECONCILED BY THE M.D. AND THE NURSING STAFF.    "

## 2018-03-14 NOTE — MR AVS SNAPSHOT
After Visit Summary   3/14/2018    Faustina Ahmadi    MRN: [id number]           Patient Information     Date Of Birth          1950        Visit Information        Provider Department      3/14/2018 10:00 AM Gurdeep Bardales MD [facility]        Today's Diagnoses     Asymptomatic postmenopausal status    -  1    Rash        Hyperglycemia           Follow-ups after your visit        Future tests that were ordered for you today     Open Future Orders        Priority Expected Expires Ordered    **Basic metabolic panel FUTURE anytime Routine 3/14/2018 3/14/2019 3/14/2018    DX Hip/Pelvis/Spine Routine  3/14/2019 3/14/2018            Who to contact     If you have questions or need follow up information about today's clinic visit or your schedule please contact [facility] directly at [phone].  Normal or non-critical lab and imaging results will be communicated to you by MyChart, letter or phone within 4 business days after the clinic has received the results. If you do not hear from us within 7 days, please contact the clinic through MyChart or phone. If you have a critical or abnormal lab result, we will notify you by phone as soon as possible.  Submit refill requests through [employer] or call your pharmacy and they will forward the refill request to us. Please allow 3 business days for your refill to be completed.          Additional Information About Your Visit        MyChart Information     [employer] gives you secure access to your electronic health record. If you see a primary care provider, you can also send messages to your care team and make appointments. If you have questions, please call your primary care clinic.  If you do not have a primary care provider, please call [phone] and they will assist you.        Care EveryWhere ID     This is your Care EveryWhere ID. This could be used by other organizations to access your [facility]  "records  [id number]        Your Vitals Were     Pulse Temperature Respirations Height Pulse Oximetry BMI (Body Mass Index)    106 97.6  F (36.4  C) (Oral) 15 5' 5\" (1.651 m) 95% 19.24 kg/m2       Blood Pressure from Last 3 Encounters:   18 130/80   17 136/80   17 144/82    Weight from Last 3 Encounters:   18 115 lb 9.6 oz (52.4 kg)   17 116 lb (52.6 kg)   17 120 lb 14.4 oz (54.8 kg)                 Today's Medication Changes          These changes are accurate as of 3/14/18 10:14 AM.  If you have any questions, ask your nurse or doctor.               Start taking these medicines.        Dose/Directions    clobetasol 0.05 % ointment   Commonly known as:  TEMOVATE   Used for:  Rash   Started by:  Gurdeep Bardales MD        Apply topically 2 times daily   Quantity:  45 g   Refills:  1       tacrolimus 0.1 % ointment   Commonly known as:  PROTOPIC   Used for:  Rash   Started by:  Gurdeep Bardales MD        Apply topically 2 times daily   Quantity:  30 g   Refills:  1            Where to get your medicines      These medications were sent to [facility] [address] [place] [address] [address] AT [facility] [address] [facility] [address]    Hours:  24-hours Phone:  [phone]     clobetasol 0.05 % ointment    tacrolimus 0.1 % ointment                Primary Care Provider Office Phone # Fax #    Pb Birch [phone]       XXX  XXX [address]  [facility] [address]        Equal Access to Services     CASSIDY LU AH: Hadii madelin Zarate, waaxda luqadaha, qaybta kaalmada adeegyada, nishi youssef. So [facility] [phone].    ATENCIÓN: Si habla español, tiene a guo disposición servicios gratuitos de asistencia lingüística. Llame al [phone].    We comply with applicable federal civil rights laws and [place] laws. We do not discriminate on the basis of race, color, national " origin, age, disability, sex, sexual orientation, or gender identity.            Thank you!     Thank you for choosing [facility]  for your care. Our goal is always to provide you with excellent care. Hearing back from our patients is one way we can continue to improve our services. Please take a few minutes to complete the written survey that you may receive in the mail after your visit with us. Thank you!             Your Updated Medication List - Protect others around you: Learn how to safely use, store and throw away your medicines at www.disposemymeds.org.          This list is accurate as of 3/14/18 10:14 AM.  Always use your most recent med list.                   Brand Name Dispense Instructions for use Diagnosis    ciprofloxacin-dexamethasone otic suspension    CIPRODEX     2 drops        clobetasol 0.05 % ointment    TEMOVATE    45 g    Apply topically 2 times daily    Rash       EXCEDRIN MIGRAINE 250-250-65 MG per tablet   Generic drug:  aspirin-acetaminophen-caffeine     0    1 TABLET EVERY 4 HOURS AS NEEDED    Other forms of migraine, without mention of intractable migraine without mention of status migrainosus       mirtazapine 15 MG tablet    REMERON    45 tablet    take 1/2 tablet daily @ bedtime    Mild episode of recurrent major depressive disorder (H)       tacrolimus 0.1 % ointment    PROTOPIC    30 g    Apply topically 2 times daily    Rash

## 2018-03-15 ENCOUNTER — TELEPHONE (OUTPATIENT)
Dept: INTERNAL MEDICINE | Facility: CLINIC | Age: 68
End: 2018-03-15

## 2018-03-15 NOTE — TELEPHONE ENCOUNTER
Prior Authorization Retail Medication Request    Medication/Dose: clobetasol prop 0.05% oint 45gm  ICD code (if different than what is on RX):R21  Previously Tried and Failed:  Rationale:    Insurance Name: [employer]  Insurance ID: [id number]      Pharmacy Information (if different than what is on RX)  Name:[facility] [place], [place].  Phone: [phone] FAX: [phone]

## 2018-03-20 NOTE — TELEPHONE ENCOUNTER
Central Prior Authorization Team   Phone: [phone]      Prior Authorization Approval    Authorization Effective Date: 12/20/2017  Authorization Expiration Date: 3/20/2019  Medication: clobetasol prop 0.05% oint 45gm - Approved  Insurance Company: [employer] - Phone [phone] Fax [phone]  Expected CoPay: 158$      Which Pharmacy is filling the prescription (Not needed for infusion/clinic administered): [facility] [address] [place] [address] [address] AT [facility] [address] [facility]  Pharmacy Notified: Yes  Patient Notified: Yes

## 2018-04-18 ENCOUNTER — HOSPITAL ENCOUNTER (OUTPATIENT)
Dept: BONE DENSITY | Facility: CLINIC | Age: 68
Discharge: HOME | End: 2018-04-18
Attending: INTERNAL MEDICINE | Admitting: INTERNAL MEDICINE
Payer: MEDICARE

## 2018-04-18 DIAGNOSIS — Z78.0 ASYMPTOMATIC POSTMENOPAUSAL STATUS: ICD-10-CM

## 2018-04-18 PROCEDURE — 77080 DXA BONE DENSITY AXIAL: CPT

## 2018-09-05 DIAGNOSIS — Z12.31 VISIT FOR SCREENING MAMMOGRAM: ICD-10-CM

## 2018-09-05 PROCEDURE — 77067 SCR MAMMO BI INCL CAD: CPT | Mod: TC

## 2018-09-13 ENCOUNTER — OFFICE VISIT (OUTPATIENT)
Dept: INTERNAL MEDICINE | Facility: CLINIC | Age: 68
End: 2018-09-13
Payer: COMMERCIAL

## 2018-09-13 VITALS
DIASTOLIC BLOOD PRESSURE: 78 MMHG | RESPIRATION RATE: 16 BRPM | OXYGEN SATURATION: 99 % | WEIGHT: 121.1 LBS | HEART RATE: 109 BPM | SYSTOLIC BLOOD PRESSURE: 136 MMHG | TEMPERATURE: 98.4 F | BODY MASS INDEX: 20.15 KG/M2

## 2018-09-13 DIAGNOSIS — Z23 NEED FOR VACCINATION: ICD-10-CM

## 2018-09-13 DIAGNOSIS — R73.9 HYPERGLYCEMIA: ICD-10-CM

## 2018-09-13 DIAGNOSIS — M81.0 OSTEOPOROSIS, UNSPECIFIED OSTEOPOROSIS TYPE, UNSPECIFIED PATHOLOGICAL FRACTURE PRESENCE: Primary | ICD-10-CM

## 2018-09-13 DIAGNOSIS — Z11.59 NEED FOR HEPATITIS C SCREENING TEST: ICD-10-CM

## 2018-09-13 DIAGNOSIS — Z23 NEED FOR PROPHYLACTIC VACCINATION AGAINST STREPTOCOCCUS PNEUMONIAE (PNEUMOCOCCUS): ICD-10-CM

## 2018-09-13 PROCEDURE — 99213 OFFICE O/P EST LOW 20 MIN: CPT | Mod: 25 | Performed by: INTERNAL MEDICINE

## 2018-09-13 PROCEDURE — G0009 ADMIN PNEUMOCOCCAL VACCINE: HCPCS | Performed by: INTERNAL MEDICINE

## 2018-09-13 PROCEDURE — 90732 PPSV23 VACC 2 YRS+ SUBQ/IM: CPT | Performed by: INTERNAL MEDICINE

## 2018-09-13 NOTE — PROGRESS NOTES
SUBJECTIVE:   Faustina Ahmadi is a 67 year old female who presents to clinic today for the following health issues:    Patient here to follow up on appointment in March. When she had elevated glucose levels.  She also received my letter regards to her bone density scan.  She is also here to review her mammogram results.    Component      Latest Ref Rng & Units 6/15/2017 12/14/2017 3/14/2018   Sodium      133 - 144 mmol/L 140 140 136   Potassium      3.4 - 5.3 mmol/L 4.4 4.6 4.0   Chloride      94 - 109 mmol/L 105 107 102   Carbon Dioxide      20 - 32 mmol/L 25 24 25   Anion Gap      3 - 14 mmol/L 10 9 9   Glucose      70 - 99 mg/dL 111 (H) 111 (H) 97   Urea Nitrogen      7 - 30 mg/dL 10 14 15   Creatinine      0.52 - 1.04 mg/dL 0.73 0.75 0.68   GFR Estimate      >60 mL/min/1.7m2 80 77 87   GFR Estimate If Black      >60 mL/min/1.7m2 >90 . . . >90 >90   Calcium      8.5 - 10.1 mg/dL 9.9 9.7 9.3   Bilirubin Total      0.2 - 1.3 mg/dL 0.5     Albumin      3.4 - 5.0 g/dL 4.5     Protein Total      6.8 - 8.8 g/dL 7.5     Alkaline Phosphatase      40 - 150 U/L 78     ALT      0 - 50 U/L 22     AST      0 - 45 U/L 19         Problem list and histories reviewed & adjusted, as indicated.  Additional history: as documented    Patient Active Problem List   Diagnosis     Mild episode of recurrent major depressive disorder (H)     Other type of migraine     CARDIOVASCULAR SCREENING; LDL GOAL LESS THAN 130     Counseling regarding advanced directives     Past Surgical History:   Procedure Laterality Date     COLONOSCOPY  7/25/2017     NO HISTORY OF SURGERY         Social History   Substance Use Topics     Smoking status: Former Smoker     Types: Cigarettes     Smokeless tobacco: Never Used     Alcohol use No     Family History   Problem Relation Age of Onset     Diabetes Brother          Current Outpatient Prescriptions   Medication Sig Dispense Refill     ciprofloxacin-dexamethasone (CIPRODEX) otic suspension 2 drops        clobetasol (TEMOVATE) 0.05 % ointment Apply topically 2 times daily 45 g 1     EXCEDRIN MIGRAINE 250-250-65 MG OR TABS 1 TABLET EVERY 4 HOURS AS NEEDED 0 0     mirtazapine (REMERON) 15 MG tablet take 1/2 tablet daily @ bedtime 45 tablet 3     tacrolimus (PROTOPIC) 0.1 % ointment Apply topically 2 times daily 30 g 1     Allergies   Allergen Reactions     Honey Rash     BP Readings from Last 3 Encounters:   03/14/18 130/80   12/14/17 136/80   06/27/17 144/82    Wt Readings from Last 3 Encounters:   03/14/18 115 lb 9.6 oz (52.4 kg)   12/14/17 116 lb (52.6 kg)   06/27/17 120 lb 14.4 oz (54.8 kg)                    Reviewed and updated as needed this visit by clinical staff       Reviewed and updated as needed this visit by Provider         ROS:  CONSTITUTIONAL: NEGATIVE for fever, chills, change in weight  ENT/MOUTH: NEGATIVE for ear, mouth and throat problems  RESP: NEGATIVE for significant cough or SOB  CV: NEGATIVE for chest pain, palpitations or peripheral edema  GI: NEGATIVE for nausea, abdominal pain, heartburn, or change in bowel habits  : NEGATIVE for frequency, dysuria, or hematuria  MUSCULOSKELETAL: NEGATIVE for significant arthralgias or myalgia  HEME: NEGATIVE for bleeding problems  PSYCHIATRIC: NEGATIVE for changes in mood or affect    OBJECTIVE:                                                    /78 (BP Location: Left arm, Patient Position: Chair, Cuff Size: Adult Regular)  Pulse 109  Temp 98.4  F (36.9  C) (Oral)  Resp 16  Wt 121 lb 1.6 oz (54.9 kg)  SpO2 99%  BMI 20.15 kg/m2  Body mass index is 20.15 kg/(m^2).  GENERAL: healthy, alert and no distress  RESP: lungs clear to auscultation - no rales, no rhonchi, no wheezes  CV: regular rates and rhythm, normal S1 S2, no S3 or S4 and no click or rub   MS: extremities- no gross deformities noted, no edema  NEURO: strength and tone- normal, sensory exam- grossly normal, mentation- intact, speech- normal, reflexes- symmetric  PSYCH: Alert and  oriented times 3; speech- coherent , normal rate and volume; able to articulate logical thoughts, able to abstract reason, no tangential thoughts, no hallucinations or delusions, affect- normal     ASSESSMENT/PLAN:                                                      (M81.0) Osteoporosis, unspecified osteoporosis type, unspecified pathological fracture presence  (primary encounter diagnosis)  Comment: I discussed with the patient issues of her recent bone density scan and the fact that she has osteoporosis and I reviewed with her the options of treatment in addition to calcium and vitamin D which include a trial of a bisphosphonate.  Plan: She is not interested in doing such but will consider it and will continue with her calcium and vitamin D    (R73.9) Hyperglycemia  Comment: Recheck labs fasting and she will make a lab appoint  Plan: **Basic metabolic panel FUTURE anytime            (Z11.59) Need for hepatitis C screening test  Comment: Recommended routine screening accordingly  Plan: Hepatitis C Screen Reflex to HCV RNA Quant and         Genotype            (Z23) Need for prophylactic vaccination against Streptococcus pneumoniae (pneumococcus)  Comment: Recommended update pneumococcal vaccination  Plan:       See Patient Instructions    Gurdeep Bardales MD  [facility]    THE MEDICATION LIST HAS BEEN FULLY RECONCILED BY THE M.D. AND THE NURSING STAFF.

## 2018-09-13 NOTE — MR AVS SNAPSHOT
After Visit Summary   9/13/2018    Faustina Ahmadi    MRN: [id number]           Patient Information     Date Of Birth          1950        Visit Information        Provider Department      9/13/2018 2:20 PM Gurdeep Bardales MD [facility]        Today's Diagnoses     Osteoporosis, unspecified osteoporosis type, unspecified pathological fracture presence    -  1    Hyperglycemia        Need for hepatitis C screening test        Need for prophylactic vaccination against Streptococcus pneumoniae (pneumococcus)           Follow-ups after your visit        Future tests that were ordered for you today     Open Future Orders        Priority Expected Expires Ordered    Hepatitis C Screen Reflex to HCV RNA Quant and Genotype Routine 9/13/2018 9/30/2018 9/13/2018    **Basic metabolic panel FUTURE anytime Routine 9/13/2018 9/30/2018 9/13/2018            Who to contact     If you have questions or need follow up information about today's clinic visit or your schedule please contact [facility] directly at [phone].  Normal or non-critical lab and imaging results will be communicated to you by [employer], letter or phone within 4 business days after the clinic has received the results. If you do not hear from us within 7 days, please contact the clinic through [employer] or phone. If you have a critical or abnormal lab result, we will notify you by phone as soon as possible.  Submit refill requests through [employer] or call your pharmacy and they will forward the refill request to us. Please allow 3 business days for your refill to be completed.          Additional Information About Your Visit        MyChart Information     [employer] gives you secure access to your electronic health record. If you see a primary care provider, you can also send messages to your care team and make appointments. If you have questions, please call your primary care clinic.  If you do not  have a primary care provider, please call [phone] and they will assist you.        Care EveryWhere ID     This is your Care EveryWhere ID. This could be used by other organizations to access your [facility] medical records  [id number]        Your Vitals Were     Pulse Temperature Respirations Pulse Oximetry BMI (Body Mass Index)       109 98.4  F (36.9  C) (Oral) 16 99% 20.15 kg/m2        Blood Pressure from Last 3 Encounters:   18 136/78   18 130/80   17 136/80    Weight from Last 3 Encounters:   18 121 lb 1.6 oz (54.9 kg)   18 115 lb 9.6 oz (52.4 kg)   17 116 lb (52.6 kg)               Primary Care Provider Office Phone # Fax #    Pb Birch [phone]       XXX  XXX [address] [facility] [address]        Equal Access to Services     CASSIDY LU : Hadii aad ku hadasho Soomaali, waaxda luqadaha, qaybta kaalmada adeegyada, waxay idiin hayabeln nelson askew . So [facility] [phone].    ATENCIÓN: Si habla español, tiene a guo disposición servicios gratuitos de asistencia lingüística. [facility] [phone].    We comply with applicable federal civil rights laws and [place] laws. We do not discriminate on the basis of race, color, national origin, age, disability, sex, sexual orientation, or gender identity.            Thank you!     Thank you for choosing [facility]  for your care. Our goal is always to provide you with excellent care. Hearing back from our patients is one way we can continue to improve our services. Please take a few minutes to complete the written survey that you may receive in the mail after your visit with us. Thank you!             Your Updated Medication List - Protect others around you: Learn how to safely use, store and throw away your medicines at www.disposemymeds.org.          This list is accurate as of 18  2:25 PM.  Always use your most recent med list.                    Brand Name Dispense Instructions for use Diagnosis    ciprofloxacin-dexamethasone otic suspension    CIPRODEX     2 drops        clobetasol 0.05 % ointment    TEMOVATE    45 g    Apply topically 2 times daily    Rash       EXCEDRIN MIGRAINE 250-250-65 MG per tablet   Generic drug:  aspirin-acetaminophen-caffeine     0    1 TABLET EVERY 4 HOURS AS NEEDED    Other forms of migraine, without mention of intractable migraine without mention of status migrainosus       mirtazapine 15 MG tablet    REMERON    45 tablet    take 1/2 tablet daily @ bedtime    Mild episode of recurrent major depressive disorder (H)       tacrolimus 0.1 % ointment    PROTOPIC    30 g    Apply topically 2 times daily    Rash

## 2018-09-21 DIAGNOSIS — Z11.59 NEED FOR HEPATITIS C SCREENING TEST: ICD-10-CM

## 2018-09-21 DIAGNOSIS — R73.9 HYPERGLYCEMIA: ICD-10-CM

## 2018-09-21 LAB
ANION GAP SERPL CALCULATED.3IONS-SCNC: 9 MMOL/L (ref 3–14)
BUN SERPL-MCNC: 13 MG/DL (ref 7–30)
CALCIUM SERPL-MCNC: 9.4 MG/DL (ref 8.5–10.1)
CHLORIDE SERPL-SCNC: 102 MMOL/L (ref 94–109)
CO2 SERPL-SCNC: 24 MMOL/L (ref 20–32)
CREAT SERPL-MCNC: 0.74 MG/DL (ref 0.52–1.04)
GFR SERPL CREATININE-BSD FRML MDRD: 78 ML/MIN/1.7M2
GLUCOSE SERPL-MCNC: 67 MG/DL (ref 70–99)
POTASSIUM SERPL-SCNC: 4.8 MMOL/L (ref 3.4–5.3)
SODIUM SERPL-SCNC: 135 MMOL/L (ref 133–144)

## 2018-09-21 PROCEDURE — 36415 COLL VENOUS BLD VENIPUNCTURE: CPT | Performed by: INTERNAL MEDICINE

## 2018-09-21 PROCEDURE — 86803 HEPATITIS C AB TEST: CPT | Performed by: INTERNAL MEDICINE

## 2018-09-21 PROCEDURE — 80048 BASIC METABOLIC PNL TOTAL CA: CPT | Performed by: INTERNAL MEDICINE

## 2018-09-24 LAB — HCV AB SERPL QL IA: NONREACTIVE

## 2019-09-05 DIAGNOSIS — Z12.31 VISIT FOR SCREENING MAMMOGRAM: ICD-10-CM

## 2019-09-05 PROCEDURE — 77067 SCR MAMMO BI INCL CAD: CPT | Mod: TC

## 2019-09-19 ENCOUNTER — OFFICE VISIT (OUTPATIENT)
Dept: INTERNAL MEDICINE | Facility: CLINIC | Age: 69
End: 2019-09-19
Payer: COMMERCIAL

## 2019-09-19 VITALS
HEART RATE: 99 BPM | WEIGHT: 116.1 LBS | TEMPERATURE: 98 F | HEIGHT: 65 IN | RESPIRATION RATE: 15 BRPM | SYSTOLIC BLOOD PRESSURE: 134 MMHG | OXYGEN SATURATION: 97 % | DIASTOLIC BLOOD PRESSURE: 82 MMHG | BODY MASS INDEX: 19.34 KG/M2

## 2019-09-19 DIAGNOSIS — Z00.00 MEDICARE ANNUAL WELLNESS VISIT, SUBSEQUENT: Primary | ICD-10-CM

## 2019-09-19 DIAGNOSIS — Z13.6 CARDIOVASCULAR SCREENING; LDL GOAL LESS THAN 130: ICD-10-CM

## 2019-09-19 DIAGNOSIS — Z12.11 COLON CANCER SCREENING: ICD-10-CM

## 2019-09-19 DIAGNOSIS — F33.0 MILD EPISODE OF RECURRENT MAJOR DEPRESSIVE DISORDER (H): ICD-10-CM

## 2019-09-19 DIAGNOSIS — M81.0 OSTEOPOROSIS WITHOUT CURRENT PATHOLOGICAL FRACTURE, UNSPECIFIED OSTEOPOROSIS TYPE: ICD-10-CM

## 2019-09-19 LAB
ALBUMIN SERPL-MCNC: 4.3 G/DL (ref 3.4–5)
ALP SERPL-CCNC: 78 U/L (ref 40–150)
ALT SERPL W P-5'-P-CCNC: 23 U/L (ref 0–50)
ANION GAP SERPL CALCULATED.3IONS-SCNC: 8 MMOL/L (ref 3–14)
AST SERPL W P-5'-P-CCNC: 20 U/L (ref 0–45)
BILIRUB SERPL-MCNC: 0.5 MG/DL (ref 0.2–1.3)
BUN SERPL-MCNC: 11 MG/DL (ref 7–30)
CALCIUM SERPL-MCNC: 9.5 MG/DL (ref 8.5–10.1)
CHLORIDE SERPL-SCNC: 102 MMOL/L (ref 94–109)
CHOLEST SERPL-MCNC: 161 MG/DL
CO2 SERPL-SCNC: 24 MMOL/L (ref 20–32)
CREAT SERPL-MCNC: 0.66 MG/DL (ref 0.52–1.04)
GFR SERPL CREATININE-BSD FRML MDRD: >90 ML/MIN/{1.73_M2}
GLUCOSE SERPL-MCNC: 110 MG/DL (ref 70–99)
HDLC SERPL-MCNC: 73 MG/DL
HGB BLD-MCNC: 14.1 G/DL (ref 11.7–15.7)
LDLC SERPL CALC-MCNC: 74 MG/DL
NONHDLC SERPL-MCNC: 88 MG/DL
POTASSIUM SERPL-SCNC: 4.2 MMOL/L (ref 3.4–5.3)
PROT SERPL-MCNC: 7.6 G/DL (ref 6.8–8.8)
SODIUM SERPL-SCNC: 134 MMOL/L (ref 133–144)
TRIGL SERPL-MCNC: 71 MG/DL

## 2019-09-19 PROCEDURE — 99397 PER PM REEVAL EST PAT 65+ YR: CPT | Performed by: INTERNAL MEDICINE

## 2019-09-19 PROCEDURE — 80053 COMPREHEN METABOLIC PANEL: CPT | Performed by: INTERNAL MEDICINE

## 2019-09-19 PROCEDURE — 85018 HEMOGLOBIN: CPT | Performed by: INTERNAL MEDICINE

## 2019-09-19 PROCEDURE — 36415 COLL VENOUS BLD VENIPUNCTURE: CPT | Performed by: INTERNAL MEDICINE

## 2019-09-19 PROCEDURE — 99207 C PAF COMPLETED  NO CHARGE: CPT | Mod: 25 | Performed by: INTERNAL MEDICINE

## 2019-09-19 PROCEDURE — 80061 LIPID PANEL: CPT | Performed by: INTERNAL MEDICINE

## 2019-09-19 ASSESSMENT — PATIENT HEALTH QUESTIONNAIRE - PHQ9: SUM OF ALL RESPONSES TO PHQ QUESTIONS 1-9: 2

## 2019-09-19 ASSESSMENT — ACTIVITIES OF DAILY LIVING (ADL): CURRENT_FUNCTION: NO ASSISTANCE NEEDED

## 2019-09-19 ASSESSMENT — MIFFLIN-ST. JEOR: SCORE: 1057.51

## 2019-09-19 NOTE — Clinical Note
Please abstract the following data from this visit with this patient into the appropriate field in Epic: Patient states had colonoscopy done in 1/2017 through [facility] that was reported as stable with recommended repeat in 5 yearsTests that can be patient reported without a hard copy:Colonoscopy done on this date:  (approximately), by this group: [facility], results were as above. Other Tests found in the patient's chart through Chart Review/Care Everywhere:{Abstract Quality List (Optional):892002}Note to Abstraction: If this section is blank, no results were found via Chart Review/Care Everywhere.

## 2019-09-19 NOTE — LETTER
[facility]  [address] [address]  [phone]      9/19/2019       Faustina STANLEY Card  [address] [facility] [address]        Dear Faustina,    I am pleased to inform you that your routine blood work including your hemoglobin, sodium, potassium, calcium, kidney and liver function tests are all normal.    Your cholesterol looks good and I would not change anything at this point but would repeat your labs in 12 months.    Your blood sugar function tests are slightly abnormal and elevated and should be rechecked here in the clinic in 6 months with a follow-up visit with me, fasting.  I will look forward to seeing you at that time and please call to make an appointment.  In the meantime, please work on your diet limiting your carbohydrates and getting some good, regular exercise.    Sincerely,      Gurdeep Bardales MD  Internal Medicine

## 2019-09-19 NOTE — PROGRESS NOTES
"SUBJECTIVE:   Faustina Ahmadi is a 68 year old female who presents for Preventive Visit.  Are you in the first 12 months of your Medicare coverage?  No    Healthy Habits:     In general, how would you rate your overall health?  Good    Frequency of exercise:  2-3 days/week    Duration of exercise:  15-30 minutes    Do you usually eat at least 4 servings of fruit and vegetables a day, include whole grains    & fiber and avoid regularly eating high fat or \"junk\" foods?  Yes    Taking medications regularly:  Yes    Medication side effects:  Lightheadedness    Ability to successfully perform activities of daily living:  No assistance needed    Home Safety:  No safety concerns identified    Hearing Impairment:  Difficulty following a conversation in a noisy restaurant or crowded room    In the past 6 months, have you been bothered by leaking of urine?  No    In general, how would you rate your overall mental or emotional health?  Good      PHQ-2 Total Score: 0    Additional concerns today:  No    Do you feel safe in your environment? Yes    Do you have a Health Care Directive? No: Advance care planning was reviewed with patient; patient declined at this time.      Fall risk  Fallen 2 or more times in the past year?: No  Any fall with injury in the past year?: No    Cognitive Screening   1) Repeat 3 items (Leader, Season, Table)    2) Clock draw: NORMAL  3) 3 item recall: Recalls 3 objects  Results: 3 items recalled: COGNITIVE IMPAIRMENT LESS LIKELY    Mini-CogTM Copyright S Scott. Licensed by the author for use in [facility]; reprinted with permission (delicia@.[facility]). All rights reserved.      Do you have sleep apnea, excessive snoring or daytime drowsiness?: no    Reviewed and updated as needed this visit by clinical staff         Reviewed and updated as needed this visit by Provider        Social History     Tobacco Use     Smoking status: Former Smoker     Types: Cigarettes     Smokeless tobacco: Never Used "   Substance Use Topics     Alcohol use: No         No flowsheet data found.            Current providers sharing in care for this patient include:   Patient Care Team:  Gurdeep Bardales MD as PCP - General (Internal Medicine)  Gurdeep Bardales MD as Assigned PCP    The following health maintenance items are reviewed in Epic and correct as of today:  Health Maintenance   Topic Date Due     ZOSTER IMMUNIZATION (1 of 2) 11/23/2000     MEDICARE ANNUAL WELLNESS VISIT  11/23/2015     PHQ-9  06/14/2018     FALL RISK ASSESSMENT  06/15/2018     INFLUENZA VACCINE (1) 09/01/2019     MAMMO SCREENING  09/05/2021     LIPID  06/15/2022     ADVANCE CARE PLANNING  06/27/2022     DTAP/TDAP/TD IMMUNIZATION (2 - Td) 06/15/2027     COLONOSCOPY  07/25/2027     DEXA  Completed     HEPATITIS C SCREENING  Completed     DEPRESSION ACTION PLAN  Completed     MIGRAINE ACTION PLAN  Completed     PNEUMOCOCCAL IMMUNIZATION 65+ LOW/MEDIUM RISK  Completed     IPV IMMUNIZATION  Aged Out     MENINGITIS IMMUNIZATION  Aged Out       Immunization History   Administered Date(s) Administered     Pneumo Conj 13-V (2010&after) 06/15/2017     Pneumococcal 23 valent 09/13/2018     TD (ADULT, 7+) 06/15/2017         Review of Systems  CONSTITUTIONAL: NEGATIVE for fever, chills, change in weight  INTEGUMENTARY/SKIN: NEGATIVE for worrisome rashes, moles or lesions  EYES: NEGATIVE for vision changes or irritation  ENT/MOUTH: NEGATIVE for ear, mouth and throat problems  RESP: NEGATIVE for significant cough or SOB  BREAST: NEGATIVE for masses, tenderness or discharge  CV: NEGATIVE for chest pain, palpitations or peripheral edema  GI: NEGATIVE for nausea, abdominal pain, heartburn, or change in bowel habits  : NEGATIVE for frequency, dysuria, or hematuria  MUSCULOSKELETAL: NEGATIVE for significant arthralgias or myalgia  NEURO: NEGATIVE for weakness, dizziness or paresthesias  ENDOCRINE: NEGATIVE for temperature intolerance, skin/hair changes  HEME: NEGATIVE for  "bleeding problems  PSYCHIATRIC: NEGATIVE for changes in mood or affect    OBJECTIVE:   /82   Pulse 99   Temp 98  F (36.7  C) (Tympanic)   Resp 15   Ht 1.651 m (5' 5\")   Wt 52.7 kg (116 lb 1.6 oz)   SpO2 97%   BMI 19.32 kg/m   Estimated body mass index is 20.15 kg/m  as calculated from the following:    Height as of 3/14/18: 1.651 m (5' 5\").    Weight as of 9/13/18: 54.9 kg (121 lb 1.6 oz).  Physical Exam  GENERAL:  alert and no distress  EYES: Eyes grossly normal to inspection, PERRL and conjunctivae and sclerae normal  HENT: ear canals and TM's normal, nose and mouth without ulcers or lesions, hearing aids in place and mildly hard of hearing  NECK: no adenopathy, no asymmetry, masses, or scars and thyroid normal to palpation  RESP: lungs clear to auscultation - no rales, rhonchi or wheezes  CV: regular rate and rhythm, normal S1 S2, no S3 or S4, no murmur, click or rub, no peripheral edema and peripheral pulses strong  ABDOMEN: soft, nontender, no hepatosplenomegaly, no masses and bowel sounds normal  MS: no gross musculoskeletal defects noted, no edema  NEURO: No focal changes  PSYCH: mentation appears normal, affect normal/bright    ASSESSMENT / PLAN:   1. Medicare annual wellness visit, subsequent  Advised flu vaccination and updated shingles vaccination  - PAF COMPLETED  - Hemoglobin  - Comprehensive metabolic panel  - Lipid Profile    2. Mild episode of recurrent major depressive disorder (H)  Stable with PHQ 9 score as recorded, continuing with Remeron    3. CARDIOVASCULAR SCREENING; LDL GOAL LESS THAN 130  Labs ordered as fasting per routine.  - Lipid Profile    4. Colon cancer screening  Patient states had colonoscopy done in 2017 through [facility] that was reported as stable with recommended repeat in 5 years  - Fecal colorectal cancer screen (FIT); Future    5. Osteoporosis without current pathological fracture, unspecified osteoporosis type  Reviewed again bone density scan with patient " "she is not interested in further evaluation or treatment.  I did discuss with her the risks of potential hip fracture.      End of Life Planning:  Patient currently has an advanced directive: No.  I have verified the patient's ablity to prepare an advanced directive/make health care decisions.  Literature was not provided to assist patient in preparing an advanced directive.    COUNSELING:  Reviewed preventive health counseling, as reflected in patient instructions       Regular exercise       Healthy diet/nutrition    Estimated body mass index is 20.15 kg/m  as calculated from the following:    Height as of 3/14/18: 1.651 m (5' 5\").    Weight as of 9/13/18: 54.9 kg (121 lb 1.6 oz).       reports that she has quit smoking. Her smoking use included cigarettes. She has never used smokeless tobacco.      Appropriate preventive services were discussed with this patient, including applicable screening as appropriate for cardiovascular disease, diabetes, osteopenia/osteoporosis, and glaucoma.  As appropriate for age/gender, discussed screening for colorectal cancer, prostate cancer, breast cancer, and cervical cancer. Checklist reviewing preventive services available has been given to the patient.    Reviewed patients plan of care and provided an AVS. The Basic Care Plan (routine screening as documented in Health Maintenance) for Faustina meets the Care Plan requirement. This Care Plan has been established and reviewed with the Patient.    Counseling Resources:  ATP IV Guidelines  Pooled Cohorts Equation Calculator  Breast Cancer Risk Calculator  FRAX Risk Assessment  ICSI Preventive Guidelines  Dietary Guidelines for Americans, 2010  USDA's MyPlate  ASA Prophylaxis  Lung CA Screening    Gurdeep Bardales MD  [facility]    Identified Health Risks:  "

## 2019-09-23 DIAGNOSIS — Z12.11 COLON CANCER SCREENING: ICD-10-CM

## 2019-09-23 PROCEDURE — 82274 ASSAY TEST FOR BLOOD FECAL: CPT | Performed by: INTERNAL MEDICINE

## 2019-09-28 LAB — HEMOCCULT STL QL IA: POSITIVE

## 2019-09-29 ENCOUNTER — HEALTH MAINTENANCE LETTER (OUTPATIENT)
Age: 69
End: 2019-09-29

## 2019-10-01 ENCOUNTER — TELEPHONE (OUTPATIENT)
Dept: INTERNAL MEDICINE | Facility: CLINIC | Age: 69
End: 2019-10-01

## 2019-10-01 NOTE — TELEPHONE ENCOUNTER
Noted, please inform patient that if notes increasing issues with bleeding or dark stools that should be seen for further assessment

## 2019-10-01 NOTE — TELEPHONE ENCOUNTER
Reason for Call:  Other call back    Detailed comments: Patient will wait 6 mos before making any appointments.  Please call Artie as the most recent call made to [place] needs clarification    Phone Number Patient can be reached at: Artie, [phone]    Best Time: any time    Can we leave a detailed message on this number? YES    Call taken on 10/1/2019 at 1:13 PM by Doris Boogie

## 2019-10-01 NOTE — TELEPHONE ENCOUNTER
Spoke with Artie Martinez, patient's significant other, to clarify last phone call.   Artie states that patient has had this before, since 2005. Has been treating with diet and Citracel.   Patient last had colonoscopy in 2017 and was diagnosed with Diverticulosis.   Wants to wait 6 months to return and when labs are due.   Anabelle Maldonado, CMA

## 2020-02-27 ENCOUNTER — OFFICE VISIT (OUTPATIENT)
Dept: INTERNAL MEDICINE | Facility: CLINIC | Age: 70
End: 2020-02-27
Payer: COMMERCIAL

## 2020-02-27 VITALS
SYSTOLIC BLOOD PRESSURE: 184 MMHG | BODY MASS INDEX: 19.68 KG/M2 | HEART RATE: 125 BPM | RESPIRATION RATE: 16 BRPM | WEIGHT: 118.1 LBS | OXYGEN SATURATION: 94 % | HEIGHT: 65 IN | DIASTOLIC BLOOD PRESSURE: 98 MMHG | TEMPERATURE: 97.4 F

## 2020-02-27 DIAGNOSIS — I10 ESSENTIAL HYPERTENSION, BENIGN: ICD-10-CM

## 2020-02-27 DIAGNOSIS — R73.9 HYPERGLYCEMIA: Primary | ICD-10-CM

## 2020-02-27 LAB
ANION GAP SERPL CALCULATED.3IONS-SCNC: 10 MMOL/L (ref 3–14)
BUN SERPL-MCNC: 11 MG/DL (ref 7–30)
CALCIUM SERPL-MCNC: 9.4 MG/DL (ref 8.5–10.1)
CHLORIDE SERPL-SCNC: 102 MMOL/L (ref 94–109)
CO2 SERPL-SCNC: 20 MMOL/L (ref 20–32)
CREAT SERPL-MCNC: 0.63 MG/DL (ref 0.52–1.04)
GFR SERPL CREATININE-BSD FRML MDRD: >90 ML/MIN/{1.73_M2}
GLUCOSE SERPL-MCNC: 86 MG/DL (ref 70–99)
HBA1C MFR BLD: 5.3 % (ref 0–5.6)
POTASSIUM SERPL-SCNC: 5 MMOL/L (ref 3.4–5.3)
SODIUM SERPL-SCNC: 132 MMOL/L (ref 133–144)

## 2020-02-27 PROCEDURE — 80048 BASIC METABOLIC PNL TOTAL CA: CPT | Performed by: INTERNAL MEDICINE

## 2020-02-27 PROCEDURE — 83036 HEMOGLOBIN GLYCOSYLATED A1C: CPT | Performed by: INTERNAL MEDICINE

## 2020-02-27 PROCEDURE — 36415 COLL VENOUS BLD VENIPUNCTURE: CPT | Performed by: INTERNAL MEDICINE

## 2020-02-27 PROCEDURE — 99213 OFFICE O/P EST LOW 20 MIN: CPT | Performed by: INTERNAL MEDICINE

## 2020-02-27 ASSESSMENT — MIFFLIN-ST. JEOR: SCORE: 1061.58

## 2020-02-27 NOTE — PROGRESS NOTES
Subjective     Faustina Ahmadi is a 69 year old female who presents to clinic today for the following health issues:    HPI     Recheck of glucose from 09/19/19   The patient is told me that she has followed up for her positive occult stool with the GI doctor and is getting this evaluated.    I also informed the patient that her blood pressure is elevated today.  She states that she periodically checks it at home and that has been stable but her  reports that she gets anxious at times and when she gets anxious her blood pressure becomes elevated.    Patient Active Problem List   Diagnosis     Mild episode of recurrent major depressive disorder (H)     Other type of migraine     CARDIOVASCULAR SCREENING; LDL GOAL LESS THAN 130     Counseling regarding advanced directives     Osteoporosis without current pathological fracture, unspecified osteoporosis type     Past Surgical History:   Procedure Laterality Date     COLONOSCOPY  7/25/2017     NO HISTORY OF SURGERY         Social History     Tobacco Use     Smoking status: Former Smoker     Packs/day: 0.00     Years: 0.00     Pack years: 0.00     Types: Cigarettes     Smokeless tobacco: Never Used   Substance Use Topics     Alcohol use: No     Family History   Problem Relation Age of Onset     Diabetes Brother          Current Outpatient Medications   Medication Sig Dispense Refill     EXCEDRIN MIGRAINE 250-250-65 MG OR TABS 1 TABLET EVERY 4 HOURS AS NEEDED 0 0     mirtazapine (REMERON) 15 MG tablet take 1/2 tablet daily @ bedtime 45 tablet 3     tacrolimus (PROTOPIC) 0.1 % ointment Apply topically 2 times daily 30 g 1     Allergies   Allergen Reactions     Honey Rash     BP Readings from Last 3 Encounters:   09/19/19 134/82   09/13/18 136/78   03/14/18 130/80    Wt Readings from Last 3 Encounters:   09/19/19 52.7 kg (116 lb 1.6 oz)   09/13/18 54.9 kg (121 lb 1.6 oz)   03/14/18 52.4 kg (115 lb 9.6 oz)         Reviewed and updated as needed this visit by Provider      "    Review of Systems   ROS COMP: CONSTITUTIONAL: NEGATIVE for fever, chills, change in weight  EYES: NEGATIVE for vision changes or irritation  ENT/MOUTH: NEGATIVE for mouth and throat problems  RESP: NEGATIVE for significant cough or SOB  CV: NEGATIVE for chest pain, palpitations or peripheral edema  GI: NEGATIVE for nausea, abdominal pain, heartburn, or change in bowel habits  : NEGATIVE for frequency, dysuria, or hematuria  MUSCULOSKELETAL: NEGATIVE for significant arthralgias or myalgia  NEURO: NEGATIVE for weakness, dizziness or paresthesias  HEME: NEGATIVE for bleeding problems      Objective    BP (!) 184/98   Pulse 125   Temp 97.4  F (36.3  C) (Oral)   Resp 16   Ht 1.651 m (5' 5\")   Wt 53.6 kg (118 lb 1.6 oz)   SpO2 94%   BMI 19.65 kg/m    There is no height or weight on file to calculate BMI.  Physical Exam   GENERAL:  alert and no distress  EYES: Eyes grossly normal to inspection, PERRL and conjunctivae and sclerae normal  HENT: ear canals and TM's normal, nose and mouth without ulcers or lesions, [place]  NECK: no adenopathy, no asymmetry, masses, or scars and thyroid normal to palpation  RESP: lungs clear to auscultation - no rales, rhonchi or wheezes  CV: regular rate and rhythm, normal S1 S2, no S3 or S4, no click or rub, no peripheral edema and peripheral pulses strong  MS: no gross musculoskeletal defects noted, no edema  NEURO: Normal strength and tone, mentation intact and speech normal  PSYCH: mentation appears normal, affect normal/bright      Component      Latest Ref Rng & Units 9/19/2019   Sodium      133 - 144 mmol/L 134   Potassium      3.4 - 5.3 mmol/L 4.2   Chloride      94 - 109 mmol/L 102   Carbon Dioxide      20 - 32 mmol/L 24   Anion Gap      3 - 14 mmol/L 8   Glucose      70 - 99 mg/dL 110 (H)   Urea Nitrogen      7 - 30 mg/dL 11   Creatinine      0.52 - 1.04 mg/dL 0.66   GFR Estimate      >60 mL/min/1.73:m2 >90   GFR Estimate If Black      >60 mL/min/1.73:m2 >90   Calcium      " 8.5 - 10.1 mg/dL 9.5   Bilirubin Total      0.2 - 1.3 mg/dL 0.5   Albumin      3.4 - 5.0 g/dL 4.3   Protein Total      6.8 - 8.8 g/dL 7.6   Alkaline Phosphatase      40 - 150 U/L 78   ALT      0 - 50 U/L 23   AST      0 - 45 U/L 20   Cholesterol      <200 mg/dL 161   Triglycerides      <150 mg/dL 71   HDL Cholesterol      >49 mg/dL 73   LDL Cholesterol Calculated      <100 mg/dL 74   Non HDL Cholesterol      <130 mg/dL 88   Hemoglobin      11.7 - 15.7 g/dL 14.1     Assessment & Plan     1. Hyperglycemia  Recheck fasting blood sugar and A1c level today.  Discussed dietary change and exercise and recheck blood sugar in 6 months  - Basic metabolic panel  - Hemoglobin A1c    2. Essential hypertension, benign  Advise highly the blood pressure be treated as elevated levels are concerning.  Patient is not willing to do so and does not feel that she has high blood pressure.  I discussed with her the risks of associated labile hypertension particularly if precipitated by unknown sources.  I discussed the risks of stroke and heart disease but the patient is still not willing to treat her blood pressure.  I advised that she should monitor this closely and recheck her blood pressure in the clinic here or call me if she decides to start antihypertensive therapy     See Patient Instructions    Return in about 3 months (around 5/27/2020) for BP Recheck.    Gurdeep Bardales MD  [facility]

## 2020-02-27 NOTE — LETTER
[facility]  [address] [id number] [phone]      2/27/2020       Faustina STANLEY Card  [address] [facility] [id number]        Dear Faustina,      Your Hemoglobin A1C and blood sugar tests look good.  These tests should be repeated in 6 months.    Your basic panel remains stable.      Sincerely,      Gurdeep Bardales MD  Internal Medicine

## 2020-09-29 ENCOUNTER — TELEPHONE (OUTPATIENT)
Dept: INTERNAL MEDICINE | Facility: CLINIC | Age: 70
End: 2020-09-29

## 2020-09-29 NOTE — TELEPHONE ENCOUNTER
Patient Quality Outreach      Summary:    Patient is due/failing the following:   PHQ-9 Needed and Annual wellness, date due: 9/16/20    Type of outreach:    Sent [employer] message.    Questions for provider review:    None                                                                                   **Start Working phrase here:**       Patient Active Problem List   Diagnosis     Mild episode of recurrent major depressive disorder (H)     Other type of migraine     CARDIOVASCULAR SCREENING; LDL GOAL LESS THAN 130     Counseling regarding advanced directives     Osteoporosis without current pathological fracture, unspecified osteoporosis type     Essential hypertension, benign       Patient has the following on her problem list/HM:     Depression / Dysthymia review         PHQ-9 SCORE 6/15/2017 12/14/2017 9/19/2019   PHQ-9 Total Score 0 2 2       If PHQ-9 recheck is 5 or more, route to provider for next steps.    Hypertension   Last three blood pressure readings:  BP Readings from Last 3 Encounters:   02/27/20 (!) 184/98   09/19/19 134/82   09/13/18 136/78     Blood pressure: Failed    HTN Guidelines:  ? 139/89                                                 Yael Huizar CMA       Chart routed to self.

## 2021-01-14 ENCOUNTER — HEALTH MAINTENANCE LETTER (OUTPATIENT)
Age: 71
End: 2021-01-14

## 2021-03-09 ENCOUNTER — IMMUNIZATION (OUTPATIENT)
Dept: NURSING | Facility: CLINIC | Age: 71
End: 2021-03-09
Payer: COMMERCIAL

## 2021-03-09 PROCEDURE — 0001A PR COVID VAC PFIZER DIL RECON 30 MCG/0.3 ML IM: CPT

## 2021-03-09 PROCEDURE — 91300 PR COVID VAC PFIZER DIL RECON 30 MCG/0.3 ML IM: CPT

## 2021-03-30 ENCOUNTER — IMMUNIZATION (OUTPATIENT)
Dept: NURSING | Facility: CLINIC | Age: 71
End: 2021-03-30
Attending: STUDENT IN AN ORGANIZED HEALTH CARE EDUCATION/TRAINING PROGRAM
Payer: COMMERCIAL

## 2021-03-30 PROCEDURE — 91300 PR COVID VAC PFIZER DIL RECON 30 MCG/0.3 ML IM: CPT

## 2021-03-30 PROCEDURE — 0002A PR COVID VAC PFIZER DIL RECON 30 MCG/0.3 ML IM: CPT

## 2021-04-19 ENCOUNTER — ANCILLARY PROCEDURE (OUTPATIENT)
Dept: MAMMOGRAPHY | Facility: CLINIC | Age: 71
End: 2021-04-19
Attending: INTERNAL MEDICINE
Payer: COMMERCIAL

## 2021-04-19 DIAGNOSIS — Z12.31 VISIT FOR SCREENING MAMMOGRAM: ICD-10-CM

## 2021-04-19 PROCEDURE — 77067 SCR MAMMO BI INCL CAD: CPT | Mod: TC | Performed by: RADIOLOGY

## 2021-05-03 ENCOUNTER — OFFICE VISIT (OUTPATIENT)
Dept: INTERNAL MEDICINE | Facility: CLINIC | Age: 71
End: 2021-05-03
Payer: COMMERCIAL

## 2021-05-03 VITALS
HEART RATE: 100 BPM | DIASTOLIC BLOOD PRESSURE: 75 MMHG | BODY MASS INDEX: 19.16 KG/M2 | WEIGHT: 115 LBS | OXYGEN SATURATION: 98 % | HEIGHT: 65 IN | TEMPERATURE: 97.8 F | RESPIRATION RATE: 16 BRPM | SYSTOLIC BLOOD PRESSURE: 107 MMHG

## 2021-05-03 DIAGNOSIS — F33.0 MILD EPISODE OF RECURRENT MAJOR DEPRESSIVE DISORDER (H): ICD-10-CM

## 2021-05-03 DIAGNOSIS — H61.23 BILATERAL IMPACTED CERUMEN: ICD-10-CM

## 2021-05-03 DIAGNOSIS — R73.9 HYPERGLYCEMIA: Primary | ICD-10-CM

## 2021-05-03 DIAGNOSIS — I10 ESSENTIAL HYPERTENSION, BENIGN: ICD-10-CM

## 2021-05-03 LAB
ANION GAP SERPL CALCULATED.3IONS-SCNC: 6 MMOL/L (ref 3–14)
BUN SERPL-MCNC: 12 MG/DL (ref 7–30)
CALCIUM SERPL-MCNC: 9.3 MG/DL (ref 8.5–10.1)
CHLORIDE SERPL-SCNC: 104 MMOL/L (ref 94–109)
CO2 SERPL-SCNC: 26 MMOL/L (ref 20–32)
CREAT SERPL-MCNC: 0.75 MG/DL (ref 0.52–1.04)
GFR SERPL CREATININE-BSD FRML MDRD: 80 ML/MIN/{1.73_M2}
GLUCOSE SERPL-MCNC: 115 MG/DL (ref 70–99)
HBA1C MFR BLD: 5.3 % (ref 0–5.6)
POTASSIUM SERPL-SCNC: 4 MMOL/L (ref 3.4–5.3)
SODIUM SERPL-SCNC: 136 MMOL/L (ref 133–144)

## 2021-05-03 PROCEDURE — 99214 OFFICE O/P EST MOD 30 MIN: CPT | Performed by: INTERNAL MEDICINE

## 2021-05-03 PROCEDURE — 80048 BASIC METABOLIC PNL TOTAL CA: CPT | Performed by: INTERNAL MEDICINE

## 2021-05-03 PROCEDURE — 83036 HEMOGLOBIN GLYCOSYLATED A1C: CPT | Performed by: INTERNAL MEDICINE

## 2021-05-03 PROCEDURE — 36415 COLL VENOUS BLD VENIPUNCTURE: CPT | Performed by: INTERNAL MEDICINE

## 2021-05-03 ASSESSMENT — PATIENT HEALTH QUESTIONNAIRE - PHQ9: SUM OF ALL RESPONSES TO PHQ QUESTIONS 1-9: 0

## 2021-05-03 ASSESSMENT — MIFFLIN-ST. JEOR: SCORE: 1042.52

## 2021-05-03 NOTE — PROGRESS NOTES
Assessment & Plan     Hyperglycemia  Check fasting blood sugar with A1c today.  - Basic metabolic panel  - Hemoglobin A1c    Essential hypertension, benign  Stable and continuing with current medical management in regards to dietary control    Mild episode of recurrent major depressive disorder (H)  Stable per patient in regards to PHQ-9 score    Bilateral impacted cerumen  Cerumen appears to be extremely firm and impacted.  Suggest Debrox for 1 week and then follow-up irrigation.    Reviewed benefit of updating shingles vaccination.     Work on weight loss  Regular exercise    Return in about 6 months (around 11/3/2021) for BP Recheck.    Gurdeep Bardales MD  [facility] Weems is a 70 year old who presents for the following health issues  accompanied by her Partner, Artie:    HPI     Chief Complaint   Patient presents with     Follow Up     for Blood Glucose     She is primarily here to follow-up on her blood sugar test which was slightly elevated at last blood draw.  She also had some positive fecal occult stools.  According to the family members and the patient she is following up with [facility] for these issues.    She reports that she does not need any refills at present time.    BP Readings from Last 2 Encounters:   05/03/21 107/75   02/27/20 (!) 184/98     Hemoglobin A1C (%)   Date Value   02/27/2020 5.3     LDL Cholesterol Calculated (mg/dL)   Date Value   09/19/2019 74   06/15/2017 85         Review of Systems   CONSTITUTIONAL: NEGATIVE for fever, chills, change in weight  EYES: NEGATIVE for vision changes or irritation  ENT/MOUTH: NEGATIVE for mouth and throat problems  RESP: NEGATIVE for significant cough or SOB  CV: NEGATIVE for chest pain, palpitations or peripheral edema  GI: NEGATIVE for nausea, abdominal pain, heartburn, or change in bowel habits  : NEGATIVE for frequency, dysuria, or hematuria  MUSCULOSKELETAL: NEGATIVE for significant  "arthralgias or myalgia  NEURO: NEGATIVE for weakness, dizziness or paresthesias  HEME: NEGATIVE for bleeding problems  PSYCHIATRIC: NEGATIVE for changes in mood or affect      Objective    /75   Pulse 100   Temp 97.8  F (36.6  C) (Temporal)   Resp 16   Ht 1.651 m (5' 5\")   Wt 52.2 kg (115 lb)   SpO2 98%   BMI 19.14 kg/m    Body mass index is 19.14 kg/m .     /75 at home     Physical Exam   GENERAL: healthy, alert and no distress  EYES: Eyes grossly normal to inspection, PERRL and conjunctivae and sclerae normal  HENT: [place], ear canals and  [place] with cerumen nose and mouth without ulcers or lesions  NECK: no adenopathy, no asymmetry, masses, or scars and thyroid normal to palpation  RESP: lungs clear to auscultation - no rales, rhonchi or wheezes  CV: regular rate and rhythm, normal S1 S2, no S3 or S4, no click or rub  MS: no gross musculoskeletal defects noted, no edema  NEURO: No focal chnages  PSYCH: mentation appears normal, affect normal/bright    Lab Results   Component Value Date    A1C 5.3 02/27/2020     Component      Latest Ref Rng & Units 9/19/2019 9/19/2019 9/19/2019 9/23/2019           8:28 AM  8:28 AM  8:28 AM  8:00 AM   Sodium      133 - 144 mmol/L  134     Potassium      3.4 - 5.3 mmol/L  4.2     Chloride      94 - 109 mmol/L  102     Carbon Dioxide      20 - 32 mmol/L  24     Anion Gap      3 - 14 mmol/L  8     Glucose      70 - 99 mg/dL  110 (H)     Urea Nitrogen      7 - 30 mg/dL  11     Creatinine      0.52 - 1.04 mg/dL  0.66     GFR Estimate      >60 mL/min/1.73:m2  >90     GFR Estimate If Black      >60 mL/min/1.73:m2  >90     Calcium      8.5 - 10.1 mg/dL  9.5     Bilirubin Total      0.2 - 1.3 mg/dL  0.5     Albumin      3.4 - 5.0 g/dL  4.3     Protein Total      6.8 - 8.8 g/dL  7.6     Alkaline Phosphatase      40 - 150 U/L  78     ALT      0 - 50 U/L  23     AST      0 - 45 U/L  20     Cholesterol      <200 mg/dL   161    Triglycerides      <150 mg/dL   71    HDL " Cholesterol      >49 mg/dL   73    LDL Cholesterol Calculated      <100 mg/dL   74    Non HDL Cholesterol      <130 mg/dL   88    Hemoglobin      11.7 - 15.7 g/dL 14.1      Occult Blood Scn FIT      NEG:Negative    Positive (A)   Hemoglobin A1C      0 - 5.6 %         Component      Latest Ref Rng & Units 2/27/2020 2/27/2020          10:00 AM 10:00 AM   Sodium      133 - 144 mmol/L 132 (L)    Potassium      3.4 - 5.3 mmol/L 5.0    Chloride      94 - 109 mmol/L 102    Carbon Dioxide      20 - 32 mmol/L 20    Anion Gap      3 - 14 mmol/L 10    Glucose      70 - 99 mg/dL 86    Urea Nitrogen      7 - 30 mg/dL 11    Creatinine      0.52 - 1.04 mg/dL 0.63    GFR Estimate      >60 mL/min/1.73:m2 >90    GFR Estimate If Black      >60 mL/min/1.73:m2 >90    Calcium      8.5 - 10.1 mg/dL 9.4    Bilirubin Total      0.2 - 1.3 mg/dL     Albumin      3.4 - 5.0 g/dL     Protein Total      6.8 - 8.8 g/dL     Alkaline Phosphatase      40 - 150 U/L     ALT      0 - 50 U/L     AST      0 - 45 U/L     Cholesterol      <200 mg/dL     Triglycerides      <150 mg/dL     HDL Cholesterol      >49 mg/dL     LDL Cholesterol Calculated      <100 mg/dL     Non HDL Cholesterol      <130 mg/dL     Hemoglobin      11.7 - 15.7 g/dL     Occult Blood Scn FIT      NEG:Negative     Hemoglobin A1C      0 - 5.6 %  5.3

## 2021-10-23 ENCOUNTER — HEALTH MAINTENANCE LETTER (OUTPATIENT)
Age: 71
End: 2021-10-23

## 2022-02-12 ENCOUNTER — HEALTH MAINTENANCE LETTER (OUTPATIENT)
Age: 72
End: 2022-02-12

## 2022-03-06 ASSESSMENT — ENCOUNTER SYMPTOMS
HEADACHES: 1
PALPITATIONS: 0
JOINT SWELLING: 0
SORE THROAT: 0
EYE PAIN: 0
MYALGIAS: 0
COUGH: 0
CHILLS: 0
CONSTIPATION: 0
DIZZINESS: 0
ABDOMINAL PAIN: 0
HEARTBURN: 0
BREAST MASS: 0
DYSURIA: 0
ARTHRALGIAS: 0
SHORTNESS OF BREATH: 0
HEMATURIA: 0
PARESTHESIAS: 0
HEMATOCHEZIA: 0
WEAKNESS: 0
DIARRHEA: 0
NAUSEA: 0
NERVOUS/ANXIOUS: 0
FEVER: 0
FREQUENCY: 0

## 2022-03-06 ASSESSMENT — ACTIVITIES OF DAILY LIVING (ADL): CURRENT_FUNCTION: NO ASSISTANCE NEEDED

## 2022-03-08 ENCOUNTER — OFFICE VISIT (OUTPATIENT)
Dept: INTERNAL MEDICINE | Facility: CLINIC | Age: 72
End: 2022-03-08
Payer: COMMERCIAL

## 2022-03-08 VITALS
TEMPERATURE: 98.3 F | OXYGEN SATURATION: 98 % | WEIGHT: 111.5 LBS | DIASTOLIC BLOOD PRESSURE: 86 MMHG | BODY MASS INDEX: 18.58 KG/M2 | HEART RATE: 136 BPM | SYSTOLIC BLOOD PRESSURE: 168 MMHG | HEIGHT: 65 IN | RESPIRATION RATE: 14 BRPM

## 2022-03-08 DIAGNOSIS — Z12.31 VISIT FOR SCREENING MAMMOGRAM: ICD-10-CM

## 2022-03-08 DIAGNOSIS — Z12.11 COLON CANCER SCREENING: ICD-10-CM

## 2022-03-08 DIAGNOSIS — I10 ESSENTIAL HYPERTENSION, BENIGN: ICD-10-CM

## 2022-03-08 DIAGNOSIS — F33.0 MILD EPISODE OF RECURRENT MAJOR DEPRESSIVE DISORDER (H): ICD-10-CM

## 2022-03-08 DIAGNOSIS — Z78.0 ASYMPTOMATIC POSTMENOPAUSAL STATUS: ICD-10-CM

## 2022-03-08 DIAGNOSIS — Z13.6 CARDIOVASCULAR SCREENING; LDL GOAL LESS THAN 130: ICD-10-CM

## 2022-03-08 DIAGNOSIS — H91.93 BILATERAL HEARING LOSS, UNSPECIFIED HEARING LOSS TYPE: ICD-10-CM

## 2022-03-08 DIAGNOSIS — Z00.00 ENCOUNTER FOR SUBSEQUENT ANNUAL WELLNESS VISIT IN MEDICARE PATIENT: Primary | ICD-10-CM

## 2022-03-08 LAB
ALBUMIN SERPL-MCNC: 4.1 G/DL (ref 3.4–5)
ALP SERPL-CCNC: 79 U/L (ref 40–150)
ALT SERPL W P-5'-P-CCNC: 29 U/L (ref 0–50)
ANION GAP SERPL CALCULATED.3IONS-SCNC: 9 MMOL/L (ref 3–14)
AST SERPL W P-5'-P-CCNC: 20 U/L (ref 0–45)
BILIRUB SERPL-MCNC: 0.4 MG/DL (ref 0.2–1.3)
BUN SERPL-MCNC: 17 MG/DL (ref 7–30)
CALCIUM SERPL-MCNC: 9.6 MG/DL (ref 8.5–10.1)
CHLORIDE BLD-SCNC: 106 MMOL/L (ref 94–109)
CHOLEST SERPL-MCNC: 148 MG/DL
CO2 SERPL-SCNC: 23 MMOL/L (ref 20–32)
CREAT SERPL-MCNC: 0.68 MG/DL (ref 0.52–1.04)
ERYTHROCYTE [DISTWIDTH] IN BLOOD BY AUTOMATED COUNT: 13.4 % (ref 10–15)
FASTING STATUS PATIENT QL REPORTED: YES
GFR SERPL CREATININE-BSD FRML MDRD: >90 ML/MIN/1.73M2
GLUCOSE BLD-MCNC: 108 MG/DL (ref 70–99)
HCT VFR BLD AUTO: 42 % (ref 35–47)
HDLC SERPL-MCNC: 80 MG/DL
HGB BLD-MCNC: 13.6 G/DL (ref 11.7–15.7)
LDLC SERPL CALC-MCNC: 58 MG/DL
MCH RBC QN AUTO: 31.9 PG (ref 26.5–33)
MCHC RBC AUTO-ENTMCNC: 32.4 G/DL (ref 31.5–36.5)
MCV RBC AUTO: 98 FL (ref 78–100)
NONHDLC SERPL-MCNC: 68 MG/DL
PLATELET # BLD AUTO: 239 10E3/UL (ref 150–450)
POTASSIUM BLD-SCNC: 4 MMOL/L (ref 3.4–5.3)
PROT SERPL-MCNC: 7.9 G/DL (ref 6.8–8.8)
RBC # BLD AUTO: 4.27 10E6/UL (ref 3.8–5.2)
SODIUM SERPL-SCNC: 138 MMOL/L (ref 133–144)
TRIGL SERPL-MCNC: 50 MG/DL
WBC # BLD AUTO: 6.3 10E3/UL (ref 4–11)

## 2022-03-08 PROCEDURE — 36415 COLL VENOUS BLD VENIPUNCTURE: CPT | Performed by: INTERNAL MEDICINE

## 2022-03-08 PROCEDURE — 80053 COMPREHEN METABOLIC PANEL: CPT | Performed by: INTERNAL MEDICINE

## 2022-03-08 PROCEDURE — 85027 COMPLETE CBC AUTOMATED: CPT | Performed by: INTERNAL MEDICINE

## 2022-03-08 PROCEDURE — 99397 PER PM REEVAL EST PAT 65+ YR: CPT | Performed by: INTERNAL MEDICINE

## 2022-03-08 PROCEDURE — 80061 LIPID PANEL: CPT | Performed by: INTERNAL MEDICINE

## 2022-03-08 ASSESSMENT — ENCOUNTER SYMPTOMS
HEMATURIA: 0
PARESTHESIAS: 0
HEADACHES: 0
ARTHRALGIAS: 0
FEVER: 0
DIZZINESS: 0
NAUSEA: 0
CHILLS: 0
ABDOMINAL PAIN: 0
NERVOUS/ANXIOUS: 0
DYSURIA: 0
WEAKNESS: 0
PALPITATIONS: 0
SORE THROAT: 0
DIARRHEA: 0
MYALGIAS: 0
SHORTNESS OF BREATH: 0
FREQUENCY: 0
HEARTBURN: 0
JOINT SWELLING: 0
CONSTIPATION: 0
HEMATOCHEZIA: 0
COUGH: 0
BREAST MASS: 0
EYE PAIN: 0

## 2022-03-08 ASSESSMENT — ACTIVITIES OF DAILY LIVING (ADL): CURRENT_FUNCTION: NO ASSISTANCE NEEDED

## 2022-03-08 ASSESSMENT — PATIENT HEALTH QUESTIONNAIRE - PHQ9: SUM OF ALL RESPONSES TO PHQ QUESTIONS 1-9: 0

## 2022-03-08 NOTE — PROGRESS NOTES
"SUBJECTIVE:   Faustina Ahmadi is a 71 year old female who presents for Preventive Visit.  Patient has been advised of split billing requirements and indicates understanding: Yes  Are you in the first 12 months of your Medicare coverage?  No    Healthy Habits:     In general, how would you rate your overall health?  Good    Frequency of exercise:  2-3 days/week    Duration of exercise:  15-30 minutes    Do you usually eat at least 4 servings of fruit and vegetables a day, include whole grains    & fiber and avoid regularly eating high fat or \"junk\" foods?  Yes    Taking medications regularly:  Yes    Medication side effects:  None    Ability to successfully perform activities of daily living:  No assistance needed    Home Safety:  No safety concerns identified    Hearing Impairment:  Need to ask people to speak up or repeat themselves    In the past 6 months, have you been bothered by leaking of urine?  No    In general, how would you rate your overall mental or emotional health?  Good      PHQ-2 Total Score: 0    Additional concerns today:  No    Do you feel safe in your environment? Yes    Have you ever done Advance Care Planning? (For example, a Health Directive, POLST, or a discussion with a medical provider or your loved ones about your wishes): No, advance care planning information given to patient to review.  Advanced care planning was discussed at today's visit.       Fall risk  Fallen 2 or more times in the past year?: No  Any fall with injury in the past year?: No    Cognitive Screening   1) Repeat 3 items (Leader, Season, Table)    2) Clock draw: ABNORMAL - wrong time   3) 3 item recall: Recalls 3 objects  Results: 3 items recalled: COGNITIVE IMPAIRMENT LESS LIKELY    Mini-CogTM Copyright ANTHONY Chavez. Licensed by the author for use in [facility]; reprinted with permission (delicia@.[email or web address]). All rights reserved.          Reviewed and updated as needed this visit by clinical staff   Tobacco  Allergies "  Meds   Med Hx  Surg Hx  Fam Hx  Soc Hx        Reviewed and updated as needed this visit by Provider                 Social History     Tobacco Use     Smoking status: Former Smoker     Packs/day: 0.00     Years: 0.00     Pack years: 0.00     Types: Cigarettes     Smokeless tobacco: Never Used   Substance Use Topics     Alcohol use: No     If you drink alcohol do you typically have >3 drinks per day or >7 drinks per week? No    Alcohol Use 3/6/2022   Prescreen: >3 drinks/day or >7 drinks/week? Not Applicable       Current providers sharing in care for this patient include:   Patient Care Team:  Gurdeep Bardales MD as PCP - General (Internal Medicine)  Gurdeep Bardales MD as Assigned PCP    The following health maintenance items are reviewed in Epic and correct as of today:  Health Maintenance Due   Topic Date Due     ANNUAL REVIEW OF  ORDERS  Never done     LUNG CANCER SCREENING  Never done     MEDICARE ANNUAL WELLNESS VISIT  09/19/2020     PHQ-9  11/03/2021       Immunization History   Administered Date(s) Administered     COVID-19,PF,Pfizer (12+ Yrs) 03/09/2021, 03/30/2021, 10/09/2021     Influenza (High Dose) 3 valent vaccine 10/30/2018, 11/01/2019     Influenza, Quad, High Dose, Pf, 65yr+ (Fluzone HD) 11/06/2020, 11/06/2021     Pneumo Conj 13-V (2010&after) 06/15/2017     Pneumococcal 23 valent 09/13/2018     TD (ADULT, 7+) 06/15/2017     Zoster vaccine recombinant adjuvanted (SHINGRIX) 08/27/2021, 11/06/2021             Review of Systems   Constitutional: Negative for chills and fever.   HENT: Positive for hearing loss. Negative for congestion, ear pain and sore throat.    Eyes: Negative for pain and visual disturbance.   Respiratory: Negative for cough and shortness of breath.    Cardiovascular: Negative for chest pain, palpitations and peripheral edema.   Gastrointestinal: Negative for abdominal pain, constipation, diarrhea, heartburn, hematochezia and nausea.   Breasts:  Negative for tenderness,  "breast mass and discharge.   Genitourinary: Negative for dysuria, frequency, genital sores, hematuria, pelvic pain, urgency, vaginal bleeding and vaginal discharge.   Musculoskeletal: Negative for arthralgias, joint swelling and myalgias.   Skin: Negative for rash.   Neurological: Negative for dizziness, weakness, headaches and paresthesias.   Psychiatric/Behavioral: Negative for mood changes. The patient is not nervous/anxious.        OBJECTIVE:   BP (!) 168/86   Pulse (!) 136   Temp 98.3  F (36.8  C) (Temporal)   Wt 50.6 kg (111 lb 8 oz)   SpO2 98%   BMI 18.55 kg/m   Estimated body mass index is 18.55 kg/m  as calculated from the following:    Height as of 5/3/21: 1.651 m (5' 5\").    Weight as of this encounter: 50.6 kg (111 lb 8 oz).     Physical Exam  GENERAL: alert and no distress  EYES: Eyes grossly normal to inspection, PERRL and conjunctivae and sclerae normal  HENT: [place], ear canals with aidesand TM's normal, nose and mouth without ulcers or lesions  NECK: no adenopathy, no asymmetry, masses, or scars and thyroid normal to palpation  RESP: lungs clear to auscultation - no rales, rhonchi or wheezes  CV: regular rate and rhythm, normal S1 S2, no S3 or S4, no click or rub, no peripheral edema and peripheral pulses strong  ABDOMEN: soft, nontender, no hepatosplenomegaly, no masses and bowel sounds normal  NEURO: No focal changes  PSYCH: mentation appears normal, affect normal/bright        ASSESSMENT / PLAN:   (Z00.00) Encounter for subsequent annual wellness visit in Medicare patient  (primary encounter diagnosis)  Comment: Discussed with patient routine follow-up lab work.  Plan: CBC with platelets, Comprehensive metabolic         panel, Lipid Profile            (Z13.6) CARDIOVASCULAR SCREENING; LDL GOAL LESS THAN 130  Comment: Labs ordered as fasting per routine.  Plan: Lipid Profile            (I10) Essential hypertension, benign  Comment: Advised patient that she should be treated for her blood " "pressure but she states that at home it is completely normal.  She is not interested in treating her blood pressure at this point  Plan: Comprehensive metabolic panel        Have advised her to continue to monitor at home and given her parameters to watch for    (F33.0) Mild episode of recurrent major depressive disorder (H)  Comment: Stable PHQ-9 score.  Plan:     (H91.93) Bilateral hearing loss, unspecified hearing loss type  Comment: Noted as baseline and challenging to history  Plan:     (Z12.11) Colon cancer screening  Comment: Patient's apparently scheduled for upcoming colonoscopy in July of this year  Plan:     (Z12.31) Visit for screening mammogram  Comment: Ordered as routine screening.  Plan: *MA Screening Digital Bilateral          (Z78.0) Asymptomatic postmenopausal status  Comment: Ordered as screening  Plan: DX Hip/Pelvis/Spine            Patient has been advised of split billing requirements and indicates understanding: Yes    COUNSELING:  Reviewed preventive health counseling, as reflected in patient instructions       Regular exercise       Healthy diet/nutrition    Estimated body mass index is 19.14 kg/m  as calculated from the following:    Height as of 5/3/21: 1.651 m (5' 5\").    Weight as of 5/3/21: 52.2 kg (115 lb).        She reports that she has quit smoking. Her smoking use included cigarettes. She smoked 0.00 packs per day for 0.00 years. She has never used smokeless tobacco.      Appropriate preventive services were discussed with this patient, including applicable screening as appropriate for cardiovascular disease, diabetes, osteopenia/osteoporosis, and glaucoma.  As appropriate for age/gender, discussed screening for colorectal cancer, prostate cancer, breast cancer, and cervical cancer. Checklist reviewing preventive services available has been given to the patient.    Reviewed patients plan of care and provided an AVS. The Basic Care Plan (routine screening as documented in Health " Maintenance) for Faustina meets the Care Plan requirement. This Care Plan has been established and reviewed with the Patient.    Counseling Resources:  ATP IV Guidelines  Pooled Cohorts Equation Calculator  Breast Cancer Risk Calculator  Breast Cancer: Medication to Reduce Risk  FRAX Risk Assessment  ICSI Preventive Guidelines  Dietary Guidelines for Americans, 2010  USDA's MyPlate  ASA Prophylaxis  Lung CA Screening    Gurdeep Bardales MD  [facility]    Identified Health Risks:

## 2022-04-20 ENCOUNTER — ANCILLARY PROCEDURE (OUTPATIENT)
Dept: MAMMOGRAPHY | Facility: CLINIC | Age: 72
End: 2022-04-20
Attending: INTERNAL MEDICINE
Payer: COMMERCIAL

## 2022-04-20 DIAGNOSIS — Z12.31 VISIT FOR SCREENING MAMMOGRAM: ICD-10-CM

## 2022-04-20 PROCEDURE — 77067 SCR MAMMO BI INCL CAD: CPT | Mod: TC | Performed by: RADIOLOGY

## 2022-10-10 ENCOUNTER — HEALTH MAINTENANCE LETTER (OUTPATIENT)
Age: 72
End: 2022-10-10

## 2022-10-10 ENCOUNTER — TRANSFERRED RECORDS (OUTPATIENT)
Dept: HEALTH INFORMATION MANAGEMENT | Facility: CLINIC | Age: 72
End: 2022-10-10

## 2023-04-04 ASSESSMENT — ACTIVITIES OF DAILY LIVING (ADL): CURRENT_FUNCTION: NO ASSISTANCE NEEDED

## 2023-04-04 ASSESSMENT — ENCOUNTER SYMPTOMS
SHORTNESS OF BREATH: 0
WEAKNESS: 0
HEMATOCHEZIA: 0
FREQUENCY: 0
COUGH: 0
EYE PAIN: 0
JOINT SWELLING: 0
PARESTHESIAS: 0
HEARTBURN: 0
PALPITATIONS: 0
NAUSEA: 0
SORE THROAT: 0
CHILLS: 0
NERVOUS/ANXIOUS: 0
HEMATURIA: 0
ARTHRALGIAS: 0
DYSURIA: 0
HEADACHES: 0
CONSTIPATION: 0
BREAST MASS: 0
DIARRHEA: 0
DIZZINESS: 0
FEVER: 0
ABDOMINAL PAIN: 0
MYALGIAS: 0

## 2023-04-11 ENCOUNTER — OFFICE VISIT (OUTPATIENT)
Dept: INTERNAL MEDICINE | Facility: CLINIC | Age: 73
End: 2023-04-11
Payer: COMMERCIAL

## 2023-04-11 VITALS
SYSTOLIC BLOOD PRESSURE: 160 MMHG | WEIGHT: 116.6 LBS | OXYGEN SATURATION: 98 % | BODY MASS INDEX: 19.91 KG/M2 | DIASTOLIC BLOOD PRESSURE: 90 MMHG | TEMPERATURE: 97.6 F | HEART RATE: 119 BPM | HEIGHT: 64 IN

## 2023-04-11 DIAGNOSIS — Z13.6 CARDIOVASCULAR SCREENING; LDL GOAL LESS THAN 130: ICD-10-CM

## 2023-04-11 DIAGNOSIS — Z12.31 VISIT FOR SCREENING MAMMOGRAM: ICD-10-CM

## 2023-04-11 DIAGNOSIS — Z23 NEED FOR DIPHTHERIA-TETANUS-PERTUSSIS (TDAP) VACCINE: ICD-10-CM

## 2023-04-11 DIAGNOSIS — Z78.0 ASYMPTOMATIC POSTMENOPAUSAL STATUS: ICD-10-CM

## 2023-04-11 DIAGNOSIS — Z00.00 ENCOUNTER FOR MEDICARE ANNUAL WELLNESS EXAM: ICD-10-CM

## 2023-04-11 DIAGNOSIS — M81.0 OSTEOPOROSIS WITHOUT CURRENT PATHOLOGICAL FRACTURE, UNSPECIFIED OSTEOPOROSIS TYPE: ICD-10-CM

## 2023-04-11 DIAGNOSIS — Z12.11 COLON CANCER SCREENING: ICD-10-CM

## 2023-04-11 DIAGNOSIS — F33.0 MILD EPISODE OF RECURRENT MAJOR DEPRESSIVE DISORDER (H): ICD-10-CM

## 2023-04-11 DIAGNOSIS — I10 ESSENTIAL HYPERTENSION, BENIGN: ICD-10-CM

## 2023-04-11 DIAGNOSIS — Z00.00 ENCOUNTER FOR SUBSEQUENT ANNUAL WELLNESS VISIT IN MEDICARE PATIENT: Primary | ICD-10-CM

## 2023-04-11 LAB
ALBUMIN SERPL BCG-MCNC: 4.6 G/DL (ref 3.5–5.2)
ALP SERPL-CCNC: 75 U/L (ref 35–104)
ALT SERPL W P-5'-P-CCNC: 22 U/L (ref 10–35)
ANION GAP SERPL CALCULATED.3IONS-SCNC: 17 MMOL/L (ref 7–15)
AST SERPL W P-5'-P-CCNC: 28 U/L (ref 10–35)
BILIRUB SERPL-MCNC: 0.5 MG/DL
BUN SERPL-MCNC: 11.5 MG/DL (ref 8–23)
CALCIUM SERPL-MCNC: 10.1 MG/DL (ref 8.8–10.2)
CHLORIDE SERPL-SCNC: 102 MMOL/L (ref 98–107)
CHOLEST SERPL-MCNC: 174 MG/DL
CREAT SERPL-MCNC: 0.68 MG/DL (ref 0.51–0.95)
DEPRECATED HCO3 PLAS-SCNC: 22 MMOL/L (ref 22–29)
ERYTHROCYTE [DISTWIDTH] IN BLOOD BY AUTOMATED COUNT: 12.6 % (ref 10–15)
GFR SERPL CREATININE-BSD FRML MDRD: >90 ML/MIN/1.73M2
GLUCOSE SERPL-MCNC: 113 MG/DL (ref 70–99)
HCT VFR BLD AUTO: 41.8 % (ref 35–47)
HDLC SERPL-MCNC: 79 MG/DL
HGB BLD-MCNC: 13.8 G/DL (ref 11.7–15.7)
LDLC SERPL CALC-MCNC: 81 MG/DL
MCH RBC QN AUTO: 32.3 PG (ref 26.5–33)
MCHC RBC AUTO-ENTMCNC: 33 G/DL (ref 31.5–36.5)
MCV RBC AUTO: 98 FL (ref 78–100)
NONHDLC SERPL-MCNC: 95 MG/DL
PLATELET # BLD AUTO: 244 10E3/UL (ref 150–450)
POTASSIUM SERPL-SCNC: 4.1 MMOL/L (ref 3.4–5.3)
PROT SERPL-MCNC: 7.6 G/DL (ref 6.4–8.3)
RBC # BLD AUTO: 4.27 10E6/UL (ref 3.8–5.2)
SODIUM SERPL-SCNC: 141 MMOL/L (ref 136–145)
TRIGL SERPL-MCNC: 71 MG/DL
WBC # BLD AUTO: 5.8 10E3/UL (ref 4–11)

## 2023-04-11 PROCEDURE — 80061 LIPID PANEL: CPT | Performed by: INTERNAL MEDICINE

## 2023-04-11 PROCEDURE — G0439 PPPS, SUBSEQ VISIT: HCPCS | Performed by: INTERNAL MEDICINE

## 2023-04-11 PROCEDURE — 99214 OFFICE O/P EST MOD 30 MIN: CPT | Mod: 25 | Performed by: INTERNAL MEDICINE

## 2023-04-11 PROCEDURE — 36415 COLL VENOUS BLD VENIPUNCTURE: CPT | Performed by: INTERNAL MEDICINE

## 2023-04-11 PROCEDURE — 80053 COMPREHEN METABOLIC PANEL: CPT | Performed by: INTERNAL MEDICINE

## 2023-04-11 PROCEDURE — 85027 COMPLETE CBC AUTOMATED: CPT | Performed by: INTERNAL MEDICINE

## 2023-04-11 RX ORDER — LISINOPRIL 20 MG/1
20 TABLET ORAL DAILY
Qty: 90 TABLET | Refills: 1 | Status: SHIPPED | OUTPATIENT
Start: 2023-04-11 | End: 2024-02-26

## 2023-04-11 ASSESSMENT — ENCOUNTER SYMPTOMS
PARESTHESIAS: 0
DIARRHEA: 0
FREQUENCY: 0
MYALGIAS: 0
CHILLS: 0
CONSTIPATION: 0
PALPITATIONS: 0
WEAKNESS: 0
ABDOMINAL PAIN: 0
HEADACHES: 0
HEMATURIA: 0
BREAST MASS: 0
SHORTNESS OF BREATH: 0
DYSURIA: 0
ARTHRALGIAS: 0
NERVOUS/ANXIOUS: 0
FEVER: 0
SORE THROAT: 0
EYE PAIN: 0
HEMATOCHEZIA: 0
JOINT SWELLING: 0
NAUSEA: 0
COUGH: 0
DIZZINESS: 0
HEARTBURN: 0

## 2023-04-11 ASSESSMENT — PATIENT HEALTH QUESTIONNAIRE - PHQ9
SUM OF ALL RESPONSES TO PHQ QUESTIONS 1-9: 0
10. IF YOU CHECKED OFF ANY PROBLEMS, HOW DIFFICULT HAVE THESE PROBLEMS MADE IT FOR YOU TO DO YOUR WORK, TAKE CARE OF THINGS AT HOME, OR GET ALONG WITH OTHER PEOPLE: NOT DIFFICULT AT ALL
SUM OF ALL RESPONSES TO PHQ QUESTIONS 1-9: 0

## 2023-04-11 ASSESSMENT — ACTIVITIES OF DAILY LIVING (ADL): CURRENT_FUNCTION: NO ASSISTANCE NEEDED

## 2023-04-11 NOTE — PATIENT INSTRUCTIONS
Patient Education   Personalized Prevention Plan  You are due for the preventive services outlined below.  Your care team is available to assist you in scheduling these services.  If you have already completed any of these items, please share that information with your care team to update in your medical record.  Health Maintenance Due   Topic Date Due     Diptheria Tetanus Pertussis (DTAP/TDAP/TD) Vaccine (1 - Tdap) 06/16/2017     ANNUAL REVIEW OF HM ORDERS  03/08/2023       Signs of Hearing Loss  Hearing loss is a problem shared by many people. In fact, it's one of the most common health problems, particularly as people age. Most people aged 65 and older have some hearing loss. By age 80, almost everyone does. Hearing loss often occurs slowly over the years. So, you may not realize your hearing has gotten worse.   When sudden hearing loss occurs, it's important to contact your healthcare provider right away. Your provider will do a medical exam and a hearing exam as soon as possible. This is to help find the cause and type of your sudden hearing loss. Based on your diagnosis, your healthcare provider will discuss possible treatments.      Hearing much better with one ear can be a sign of hearing loss.     Have your hearing checked  Call your healthcare provider if you:     Have to strain to hear normal conversation    Have to watch other people s faces very carefully to follow what they re saying    Need to ask people to repeat what they ve said    Often misunderstand what people are saying    Turn the volume of the television or radio up so high that others complain    Feel that people are mumbling when they re talking to you    Find that the effort to hear leaves you feeling tired and irritated    Notice, when using the phone, that you hear better with one ear than the other  [employer] last reviewed this educational content on 6/1/2022 2000-2022 [employer]. All rights reserved. This  information is not intended as a substitute for professional medical care. Always follow your healthcare professional's instructions.

## 2023-04-11 NOTE — PROGRESS NOTES
"SUBJECTIVE:   Faustina is a 72 year old who presents for Preventive Visit.  Patient has been advised of split billing requirements and indicates understanding: Yes  Are you in the first 12 months of your Medicare coverage?  No    Healthy Habits:     In general, how would you rate your overall health?  Good    Frequency of exercise:  2-3 days/week    Duration of exercise:  15-30 minutes    Do you usually eat at least 4 servings of fruit and vegetables a day, include whole grains    & fiber and avoid regularly eating high fat or \"junk\" foods?  Yes    Taking medications regularly:  Yes    Barriers to taking medications:  None    Medication side effects:  None    Ability to successfully perform activities of daily living:  No assistance needed    Home Safety:  No safety concerns identified    Hearing Impairment:  Need to ask people to speak up or repeat themselves    In the past 6 months, have you been bothered by leaking of urine?  No    In general, how would you rate your overall mental or emotional health?  Good      PHQ-2 Total Score: 0    Additional concerns today:  No    Have you ever done Advance Care Planning? (For example, a Health Directive, POLST, or a discussion with a medical provider or your loved ones about your wishes): No, advance care planning information given to patient to review.  Patient declined advance care planning discussion at this time.       Fall risk:  low    Cognitive Screening   1) Repeat 3 items (Leader, Season, Table)    2) Clock draw: NORMAL  3) 3 item recall: Recalls 3 objects  Results: 3 items recalled: COGNITIVE IMPAIRMENT LESS LIKELY    Mini-CogTM Copyright ANTHONY Chavez. Licensed by the author for use in [facility]; reprinted with permission (delicia@.[facility]). All rights reserved.      Do you have sleep apnea, excessive snoring or daytime drowsiness?: no    Reviewed and updated as needed this visit by clinical staff     Meds              Reviewed and updated as needed this " visit by Provider                 Social History     Tobacco Use     Smoking status: Former     Packs/day: 0.00     Years: 0.00     Pack years: 0.00     Types: Cigarettes     Smokeless tobacco: Never   Vaping Use     Vaping status: Not on file   Substance Use Topics     Alcohol use: No           4/4/2023     3:30 PM   Alcohol Use   Prescreen: >3 drinks/day or >7 drinks/week? No     Do you have a current opioid prescription? No  Do you use any other controlled substances or medications that are not prescribed by a provider? None      Current Outpatient Medications   Medication     EXCEDRIN MIGRAINE 250-250-65 MG OR TABS     mirtazapine (REMERON) 15 MG tablet     tacrolimus (PROTOPIC) 0.1 % ointment     No current facility-administered medications for this visit.           Current providers sharing in care for this patient include:   Patient Care Team:  Gurdeep Bardales MD as PCP - General (Internal Medicine)  Gurdeep Bardales MD as Assigned PCP    The following health maintenance items are reviewed in Epic and correct as of today:  Health Maintenance   Topic Date Due     DTAP/TDAP/TD IMMUNIZATION (1 - Tdap) 06/16/2017     MEDICARE ANNUAL WELLNESS VISIT  03/08/2023     ANNUAL REVIEW OF HM ORDERS  03/08/2023     PHQ-9  10/11/2023     FALL RISK ASSESSMENT  04/11/2024     MAMMO SCREENING  04/20/2024     LIPID  03/08/2027     ADVANCE CARE PLANNING  04/11/2028     COLORECTAL CANCER SCREENING  10/10/2032     DEXA  04/18/2033     HEPATITIS C SCREENING  Completed     DEPRESSION ACTION PLAN  Completed     MIGRAINE ACTION PLAN  Completed     INFLUENZA VACCINE  Completed     Pneumococcal Vaccine: 65+ Years  Completed     ZOSTER IMMUNIZATION  Completed     COVID-19 Vaccine  Completed     IPV IMMUNIZATION  Aged Out     MENINGITIS IMMUNIZATION  Aged Out     LUNG CANCER SCREENING  Discontinued       Immunization History   Administered Date(s) Administered     COVID-19 Vaccine 12+ (Pfizer) 03/09/2021, 03/30/2021, 10/09/2021      "COVID-19 Vaccine Bivalent Booster 12+ (Pfizer) 04/04/2023     Influenza (High Dose) 3 valent vaccine 10/30/2018, 11/01/2019     Influenza Vaccine 65+ (Fluzone HD) 11/06/2020, 11/06/2021     Pneumo Conj 13-V (2010&after) 06/15/2017     Pneumococcal 23 valent 09/13/2018     TD,PF 7+ (Tenivac) 06/15/2017     Zoster recombinant adjuvanted (SHINGRIX) 08/27/2021, 11/06/2021             Review of Systems   Constitutional: Negative for chills and fever.   HENT: Negative for congestion, ear pain, hearing loss and sore throat.    Eyes: Negative for pain and visual disturbance.   Respiratory: Negative for cough and shortness of breath.    Cardiovascular: Negative for chest pain, palpitations and peripheral edema.   Gastrointestinal: Negative for abdominal pain, constipation, diarrhea, heartburn, hematochezia and nausea.   Breasts:  Negative for tenderness, breast mass and discharge.   Genitourinary: Negative for dysuria, frequency, genital sores, hematuria, pelvic pain, urgency, vaginal bleeding and vaginal discharge.   Musculoskeletal: Negative for arthralgias, joint swelling and myalgias.   Skin: Negative for rash.   Neurological: Negative for dizziness, weakness, headaches and paresthesias.   Psychiatric/Behavioral: Negative for mood changes. The patient is not nervous/anxious.          OBJECTIVE:   BP (!) 160/90   Pulse 119   Temp 97.6  F (36.4  C) (Oral)   Ht 1.626 m (5' 4\")   Wt 52.9 kg (116 lb 9.6 oz)   LMP  (LMP Unknown)   SpO2 98%   Breastfeeding No   BMI 20.01 kg/m   Estimated body mass index is 20.01 kg/m  as calculated from the following:    Height as of this encounter: 1.626 m (5' 4\").    Weight as of this encounter: 52.9 kg (116 lb 9.6 oz).  Physical Exam  GENERAL: alert and no distress  EYES: Eyes grossly normal to inspection, PERRL and conjunctivae and sclerae normal  HENT: [place], ear canals and TM's normal, nose and mouth without ulcers or lesions  NECK: no adenopathy, no asymmetry, masses, or scars and " thyroid normal to palpation  RESP: lungs clear to auscultation - no rales, rhonchi or wheezes  CV: regular rate and rhythm, normal S1 S2, no S3 or S4, no murmur, click or rub, no peripheral edema and peripheral pulses strong  ABDOMEN: soft, nontender, no hepatosplenomegaly, no masses and bowel sounds normal  MS: no gross musculoskeletal defects noted, no edema  NEURO: Normal strength and tone, mentation intact and speech normal  PSYCH: mentation appears normal, affect normal/bright      ASSESSMENT / PLAN:   (Z00.00) Encounter for subsequent annual wellness visit in Medicare patient  (primary encounter diagnosis)  Comment: Advise updated tetanus booster and routine follow-up.  Plan: CBC with platelets, Comprehensive metabolic         panel, Lipid Profile            (I10) Essential hypertension, benign  Comment: Discussed blood pressure elevation.  Suggest initiating therapy.  Patient is willing to do such.  Discussed medication, dosing and side effect.  Recheck blood pressure 3 months.  Prescription sent to pharmacy.  Plan: Comprehensive metabolic panel, lisinopril         (ZESTRIL) 20 MG tablet, OFFICE/OUTPT         VISIT,EST,LEVL IV            (Z13.6) CARDIOVASCULAR SCREENING; LDL GOAL LESS THAN 130  Comment: Labs ordered as fasting per routine  Plan: Lipid Profile            (F33.0) Mild episode of recurrent major depressive disorder (H)  Comment: Stable per patient without acute changes  Plan: Continuing with mirtazapine    (M81.0) Osteoporosis without current pathological fracture, unspecified osteoporosis type  Comment: Advised updated bone density scan but patient not interested per family and obtain  Plan:     (Z12.11) Colon cancer screening  Comment: Advised FIT testing but patient per family not interested in obtain  Plan:     (Z12.31) Visit for screening mammogram  Comment: Ordered as routine screening  Plan:     (Z78.0) Asymptomatic postmenopausal status  Comment: Again advised bone density scan but not  interested in such  Plan:     (Z23) Need for diphtheria-tetanus-pertussis (Tdap) vaccine  Comment: Geo updating through pharmacy as needed  Plan:     Patient has been advised of split billing requirements and indicates understanding: Yes      COUNSELING:  Reviewed preventive health counseling, as reflected in patient instructions       Regular exercise       Healthy diet/nutrition        She reports that she has quit smoking. Her smoking use included cigarettes. She has never used smokeless tobacco.      Appropriate preventive services were discussed with this patient, including applicable screening as appropriate for cardiovascular disease, diabetes, osteopenia/osteoporosis, and glaucoma.  As appropriate for age/gender, discussed screening for colorectal cancer, prostate cancer, breast cancer, and cervical cancer. Checklist reviewing preventive services available has been given to the patient.    Reviewed patients plan of care and provided an AVS. The Basic Care Plan (routine screening as documented in Health Maintenance) for Faustina meets the Care Plan requirement. This Care Plan has been established and reviewed with the Patient.      Gurdeep Bardales MD  [facility]    Identified Health Risks:      The patient was provided with written information regarding signs of hearing loss.

## 2023-04-24 ENCOUNTER — ANCILLARY PROCEDURE (OUTPATIENT)
Dept: MAMMOGRAPHY | Facility: CLINIC | Age: 73
End: 2023-04-24
Attending: INTERNAL MEDICINE
Payer: COMMERCIAL

## 2023-04-24 DIAGNOSIS — Z12.31 VISIT FOR SCREENING MAMMOGRAM: ICD-10-CM

## 2023-04-24 PROCEDURE — 77067 SCR MAMMO BI INCL CAD: CPT | Mod: TC | Performed by: RADIOLOGY

## 2024-02-23 ENCOUNTER — MYC REFILL (OUTPATIENT)
Dept: INTERNAL MEDICINE | Facility: CLINIC | Age: 74
End: 2024-02-23
Payer: COMMERCIAL

## 2024-02-23 DIAGNOSIS — I10 ESSENTIAL HYPERTENSION, BENIGN: ICD-10-CM

## 2024-02-23 RX ORDER — LISINOPRIL 20 MG/1
20 TABLET ORAL DAILY
Qty: 90 TABLET | Refills: 1 | OUTPATIENT
Start: 2024-02-23

## 2024-02-24 DIAGNOSIS — I10 ESSENTIAL HYPERTENSION, BENIGN: ICD-10-CM

## 2024-02-26 RX ORDER — LISINOPRIL 20 MG/1
20 TABLET ORAL DAILY
Qty: 90 TABLET | Refills: 1 | Status: SHIPPED | OUTPATIENT
Start: 2024-02-26

## 2024-04-01 ENCOUNTER — PATIENT OUTREACH (OUTPATIENT)
Dept: CARE COORDINATION | Facility: CLINIC | Age: 74
End: 2024-04-01
Payer: COMMERCIAL

## 2024-04-15 ENCOUNTER — PATIENT OUTREACH (OUTPATIENT)
Dept: CARE COORDINATION | Facility: CLINIC | Age: 74
End: 2024-04-15
Payer: COMMERCIAL

## 2024-05-25 ENCOUNTER — HEALTH MAINTENANCE LETTER (OUTPATIENT)
Age: 74
End: 2024-05-25

## 2024-06-04 ENCOUNTER — ANCILLARY PROCEDURE (OUTPATIENT)
Dept: MAMMOGRAPHY | Facility: CLINIC | Age: 74
End: 2024-06-04
Attending: INTERNAL MEDICINE
Payer: COMMERCIAL

## 2024-06-04 DIAGNOSIS — Z12.31 VISIT FOR SCREENING MAMMOGRAM: ICD-10-CM

## 2024-06-04 PROCEDURE — 77067 SCR MAMMO BI INCL CAD: CPT | Mod: TC | Performed by: RADIOLOGY

## 2024-06-04 PROCEDURE — 77063 BREAST TOMOSYNTHESIS BI: CPT | Mod: TC | Performed by: RADIOLOGY

## 2024-06-15 SDOH — HEALTH STABILITY: PHYSICAL HEALTH: ON AVERAGE, HOW MANY MINUTES DO YOU ENGAGE IN EXERCISE AT THIS LEVEL?: 30 MIN

## 2024-06-15 SDOH — HEALTH STABILITY: PHYSICAL HEALTH: ON AVERAGE, HOW MANY DAYS PER WEEK DO YOU ENGAGE IN MODERATE TO STRENUOUS EXERCISE (LIKE A BRISK WALK)?: 3 DAYS

## 2024-06-15 ASSESSMENT — SOCIAL DETERMINANTS OF HEALTH (SDOH): HOW OFTEN DO YOU GET TOGETHER WITH FRIENDS OR RELATIVES?: ONCE A WEEK

## 2024-06-18 ENCOUNTER — OFFICE VISIT (OUTPATIENT)
Dept: INTERNAL MEDICINE | Facility: CLINIC | Age: 74
End: 2024-06-18
Payer: COMMERCIAL

## 2024-06-18 VITALS
RESPIRATION RATE: 15 BRPM | DIASTOLIC BLOOD PRESSURE: 84 MMHG | SYSTOLIC BLOOD PRESSURE: 138 MMHG | TEMPERATURE: 98.3 F | HEIGHT: 64 IN | HEART RATE: 132 BPM | WEIGHT: 114.1 LBS | OXYGEN SATURATION: 96 % | BODY MASS INDEX: 19.48 KG/M2

## 2024-06-18 DIAGNOSIS — H91.90 HEARING LOSS, UNSPECIFIED HEARING LOSS TYPE, UNSPECIFIED LATERALITY: ICD-10-CM

## 2024-06-18 DIAGNOSIS — F33.0 MILD EPISODE OF RECURRENT MAJOR DEPRESSIVE DISORDER (H): ICD-10-CM

## 2024-06-18 DIAGNOSIS — Z13.6 CARDIOVASCULAR SCREENING; LDL GOAL LESS THAN 130: ICD-10-CM

## 2024-06-18 DIAGNOSIS — M81.0 OSTEOPOROSIS WITHOUT CURRENT PATHOLOGICAL FRACTURE, UNSPECIFIED OSTEOPOROSIS TYPE: ICD-10-CM

## 2024-06-18 DIAGNOSIS — Z12.31 VISIT FOR SCREENING MAMMOGRAM: ICD-10-CM

## 2024-06-18 DIAGNOSIS — Z12.11 COLON CANCER SCREENING: ICD-10-CM

## 2024-06-18 DIAGNOSIS — Z00.00 ENCOUNTER FOR SUBSEQUENT ANNUAL WELLNESS VISIT IN MEDICARE PATIENT: Primary | ICD-10-CM

## 2024-06-18 LAB
ALBUMIN SERPL BCG-MCNC: 4.7 G/DL (ref 3.5–5.2)
ALP SERPL-CCNC: 83 U/L (ref 40–150)
ALT SERPL W P-5'-P-CCNC: 17 U/L (ref 0–50)
ANION GAP SERPL CALCULATED.3IONS-SCNC: 15 MMOL/L (ref 7–15)
AST SERPL W P-5'-P-CCNC: 25 U/L (ref 0–45)
BILIRUB SERPL-MCNC: 0.3 MG/DL
BUN SERPL-MCNC: 11.8 MG/DL (ref 8–23)
CALCIUM SERPL-MCNC: 9.9 MG/DL (ref 8.8–10.2)
CHLORIDE SERPL-SCNC: 101 MMOL/L (ref 98–107)
CHOLEST SERPL-MCNC: 166 MG/DL
CREAT SERPL-MCNC: 0.72 MG/DL (ref 0.51–0.95)
DEPRECATED HCO3 PLAS-SCNC: 22 MMOL/L (ref 22–29)
EGFRCR SERPLBLD CKD-EPI 2021: 88 ML/MIN/1.73M2
ERYTHROCYTE [DISTWIDTH] IN BLOOD BY AUTOMATED COUNT: 12.4 % (ref 10–15)
FASTING STATUS PATIENT QL REPORTED: YES
FASTING STATUS PATIENT QL REPORTED: YES
GLUCOSE SERPL-MCNC: 116 MG/DL (ref 70–99)
HCT VFR BLD AUTO: 41.3 % (ref 35–47)
HDLC SERPL-MCNC: 77 MG/DL
HGB BLD-MCNC: 13.7 G/DL (ref 11.7–15.7)
LDLC SERPL CALC-MCNC: 73 MG/DL
MCH RBC QN AUTO: 31.9 PG (ref 26.5–33)
MCHC RBC AUTO-ENTMCNC: 33.2 G/DL (ref 31.5–36.5)
MCV RBC AUTO: 96 FL (ref 78–100)
NONHDLC SERPL-MCNC: 89 MG/DL
PLATELET # BLD AUTO: 270 10E3/UL (ref 150–450)
POTASSIUM SERPL-SCNC: 4.7 MMOL/L (ref 3.4–5.3)
PROT SERPL-MCNC: 7.7 G/DL (ref 6.4–8.3)
RBC # BLD AUTO: 4.3 10E6/UL (ref 3.8–5.2)
SODIUM SERPL-SCNC: 138 MMOL/L (ref 135–145)
TRIGL SERPL-MCNC: 79 MG/DL
WBC # BLD AUTO: 6.4 10E3/UL (ref 4–11)

## 2024-06-18 PROCEDURE — 80061 LIPID PANEL: CPT | Performed by: INTERNAL MEDICINE

## 2024-06-18 PROCEDURE — 85027 COMPLETE CBC AUTOMATED: CPT | Performed by: INTERNAL MEDICINE

## 2024-06-18 PROCEDURE — 82274 ASSAY TEST FOR BLOOD FECAL: CPT | Performed by: INTERNAL MEDICINE

## 2024-06-18 PROCEDURE — 80053 COMPREHEN METABOLIC PANEL: CPT | Performed by: INTERNAL MEDICINE

## 2024-06-18 PROCEDURE — 36415 COLL VENOUS BLD VENIPUNCTURE: CPT | Performed by: INTERNAL MEDICINE

## 2024-06-18 PROCEDURE — G0439 PPPS, SUBSEQ VISIT: HCPCS | Performed by: INTERNAL MEDICINE

## 2024-06-18 NOTE — PROGRESS NOTES
Preventive Care Visit  [facility]  Gurdeep Bardales MD, Internal Medicine  Jun 18, 2024      Assessment & Plan     Encounter for subsequent annual wellness visit in Medicare patient  Labs ordered as fasting per routine.  Advised updated routine vaccinations accordingly  - Comprehensive metabolic panel; Future  - CBC with platelets; Future  - Lipid Profile; Future    Mild episode of recurrent major depressive disorder (H24)  Stable per patient and  and otherwise doing    CARDIOVASCULAR SCREENING; LDL GOAL LESS THAN 130  Ordered as fasting per routine  - Lipid Profile; Future    Osteoporosis without current pathological fracture, unspecified osteoporosis type  Updated bone density scan and treatment advised to the patient declined    Colon cancer screening  Prior colonoscopy and suggest FIT test  - Fecal colorectal cancer screen FIT; Future    Visit for screening mammogram  Reviewed updated mammography.    Hearing loss, unspecified hearing loss type, unspecified laterality  Patient currently updating hearing aids.    Patient has been advised of split billing requirements and indicates understanding: Yes        Counseling  Appropriate preventive services were discussed with this patient, including applicable screening as appropriate for fall prevention, nutrition, physical activity, Tobacco-use cessation, weight loss and cognition.  Checklist reviewing preventive services available has been given to the patient.  Reviewed patient's diet, addressing concerns and/or questions.   She is at risk for lack of exercise and has been provided with information to increase physical activity for the benefit of her well-being.   She is at risk for psychosocial distress and has been provided with information to reduce risk.   The patient was provided with written information regarding signs of hearing loss.       See Patient Instructions    Subjective   Faustina is a 73 year old, presenting for the  following:  Wellness Visit    Health Care Directive  Patient does not have a Health Care Directive or Living Will: Discussed advance care planning with patient; however, patient declined at this time.    HPI        6/15/2024   General Health   How would you rate your overall physical health? Good   Feel stress (tense, anxious, or unable to sleep) Only a little   (!) STRESS CONCERN      6/15/2024   Nutrition   Diet: Regular (no restrictions)         6/15/2024   Exercise   Days per week of moderate/strenous exercise 3 days   Average minutes spent exercising at this level 30 min         6/15/2024   Social Factors   Frequency of gathering with friends or relatives Once a week   Worry food won't last until get money to buy more No   Food not last or not have enough money for food? No   Do you have housing?  Yes   Are you worried about losing your housing? No   Lack of transportation? No   Unable to get utilities (heat,electricity)? No         6/15/2024   Fall Risk   Fallen 2 or more times in the past year? No    No   Trouble with walking or balance? No    No          6/15/2024   Activities of Daily Living- Home Safety   Needs help with the following daily activites None of the above   Safety concerns in the home None of the above         6/15/2024   Dental   Dentist two times every year? Yes         6/15/2024   Hearing Screening   Hearing concerns? (!) I NEED TO ASK PEOPLE TO SPEAK UP OR REPEAT THEMSELVES.         6/15/2024   Driving Risk Screening   Patient/family members have concerns about driving No         6/15/2024   General Alertness/Fatigue Screening   Have you been more tired than usual lately? No         6/15/2024   Urinary Incontinence Screening   Bothered by leaking urine in past 6 months No         6/15/2024   TB Screening   Were you born outside of the [place]? No       Today's PHQ-9 Score:       6/17/2024     3:12 PM   PHQ-9 SCORE   PHQ-9 Total Score MyChart 0   PHQ-9 Total Score 0         6/15/2024   Substance  Use   Alcohol more than 3/day or more than 7/wk No   Do you have a current opioid prescription? No   How severe/bad is pain from 1 to 10? 0/10 (No Pain)   Do you use any other substances recreationally? No     Social History     Tobacco Use    Smoking status: Former     Current packs/day: 0.00     Types: Cigarettes    Smokeless tobacco: Never   Vaping Use    Vaping status: Never Used   Substance Use Topics    Alcohol use: No    Drug use: No         6/4/2024   LAST FHS-7 RESULTS   1st degree relative breast or ovarian cancer No   Any relative bilateral breast cancer No   Any male have breast cancer No   Any ONE woman have BOTH breast AND ovarian cancer No   Any woman with breast cancer before 50yrs No   2 or more relatives with breast AND/OR ovarian cancer No   2 or more relatives with breast AND/OR bowel cancer No       Mammogram Screening - Mammogram every 1-2 years updated in Health Maintenance based on mutual decision making    ASCVD Risk   The 10-year ASCVD risk score (Maximilian SANCHEZ, et al., 2019) is: 18.9%    Values used to calculate the score:      Age: 73 years      Sex: Female      Is Non- : No      Diabetic: No      Tobacco smoker: No      Systolic Blood Pressure: 138 mmHg      Is BP treated: Yes      HDL Cholesterol: 79 mg/dL      Total Cholesterol: 174 mg/dL      Reviewed and updated as needed this visit by Provider                    Lab work is in process  Current providers sharing in care for this patient include:  Patient Care Team:  Gurdeep Bardales MD as PCP - General (Internal Medicine)  Gurdeep Bardales MD as Assigned PCP    The following health maintenance items are reviewed in Epic and correct as of today:  Health Maintenance   Topic Date Due    DTAP/TDAP/TD IMMUNIZATION (1 - Tdap) 06/16/2017    ANNUAL REVIEW OF HM ORDERS  03/08/2023    COVID-19 Vaccine (7 - 2023-24 season) 02/14/2024    PHQ-9  12/18/2024    MEDICARE ANNUAL WELLNESS VISIT  06/18/2025    FALL RISK  "ASSESSMENT  06/18/2025    GLUCOSE  04/11/2026    MAMMO SCREENING  06/04/2026    LIPID  04/11/2028    ADVANCE CARE PLANNING  06/18/2029    COLORECTAL CANCER SCREENING  10/10/2032    DEXA  04/18/2033    HEPATITIS C SCREENING  Completed    DEPRESSION ACTION PLAN  Completed    MIGRAINE ACTION PLAN  Completed    INFLUENZA VACCINE  Completed    Pneumococcal Vaccine: 65+ Years  Completed    ZOSTER IMMUNIZATION  Completed    RSV VACCINE (Pregnancy & 60+)  Completed    IPV IMMUNIZATION  Aged Out    HPV IMMUNIZATION  Aged Out    MENINGITIS IMMUNIZATION  Aged Out    RSV MONOCLONAL ANTIBODY  Aged Out    LUNG CANCER SCREENING  Discontinued         Review of Systems  CONSTITUTIONAL: NEGATIVE for fever, chills, change in weight  EYES: NEGATIVE for vision changes or irritation  ENT/MOUTH: NEGATIVE for ear, mouth and throat problems  RESP: NEGATIVE for significant cough or SOB  CV: NEGATIVE for chest pain, palpitations or peripheral edema  GI: NEGATIVE for nausea, abdominal pain, heartburn, or change in bowel habits  : NEGATIVE for frequency, dysuria, or hematuria  MUSCULOSKELETAL: NEGATIVE for significant arthralgias or myalgia  NEURO: NEGATIVE for weakness, dizziness or paresthesias  ENDOCRINE: NEGATIVE for temperature intolerance, skin/hair changes  HEME: NEGATIVE for bleeding problems  PSYCHIATRIC: NEGATIVE for changes in mood or affect     Objective    Exam  /84   Pulse (!) 132   Temp 98.3  F (36.8  C) (Temporal)   Resp 15   Ht 1.626 m (5' 4\")   Wt 51.8 kg (114 lb 1.6 oz)   LMP  (LMP Unknown)   SpO2 96%   BMI 19.59 kg/m     Estimated body mass index is 19.59 kg/m  as calculated from the following:    Height as of this encounter: 1.626 m (5' 4\").    Weight as of this encounter: 51.8 kg (114 lb 1.6 oz).    Physical Exam  GENERAL: alert and no distress  EYES: Eyes grossly normal to inspection, PERRL and conjunctivae and sclerae normal  HENT: ear canals and TM's normal, nose and mouth without ulcers or " lesions  HENT: Extremely hard of hearing, hearing aids in place  NECK: no adenopathy, no asymmetry, masses, or scars  RESP: lungs clear to auscultation - no rales, rhonchi or wheezes  CV: regular rate and rhythm, normal S1 S2, no S3 or S4, no murmur, click or rub, no peripheral edema  ABDOMEN: soft, nontender, no hepatosplenomegaly, no masses and bowel sounds normal  MS: no gross musculoskeletal defects noted, no edema  NEURO: No focal changes  PSYCH: mentation appears normal, affect normal/bright         6/18/2024   Mini Cog   Clock Draw Score 2 Normal   3 Item Recall 3 objects recalled   Mini Cog Total Score 5          Signed Electronically by: Gurdeep Bardales MD    Answers submitted by the patient for this visit:  Patient Health Questionnaire (Submitted on 6/17/2024)  If you checked off any problems, how difficult have these problems made it for you to do your work, take care of things at home, or get along with other people?: Not difficult at all  PHQ9 TOTAL SCORE: 0

## 2024-06-25 LAB — HEMOCCULT STL QL IA: NEGATIVE

## 2024-09-30 ENCOUNTER — TRANSFERRED RECORDS (OUTPATIENT)
Dept: HEALTH INFORMATION MANAGEMENT | Facility: CLINIC | Age: 74
End: 2024-09-30
Payer: COMMERCIAL

## 2024-10-08 NOTE — PROGRESS NOTES
Preoperative Evaluation  [address] [address]  Phone: [phone]  Primary Provider: Gurdeep Bardales MD  Pre-op Performing Provider: Gurdeep Bardales MD  Oct 9, 2024           10/4/2024   Surgical Information   What procedure is being done? Cataract surgery   Facility or Hospital where procedure/surgery will be performed: [facility]   Who is doing the procedure / surgery? Dr Riedel   Date of surgery / procedure: 10/18/2024   Time of surgery / procedure: ??   Where do you plan to recover after surgery? at home with family        Fax number for surgical facility:     Assessment & Plan     The proposed surgical procedure is considered LOW risk.    Pre-operative general physical examination  To proceed as scheduled as he is considered low risk  - CBC with platelets; Future  - Basic metabolic panel  (Ca, Cl, CO2, Creat, Gluc, K, Na, BUN); Future    Senile cataract, unspecified age-related cataract type, unspecified laterality  Routine postoperative course per surgery as directed.    Essential hypertension, benign  Stable on therapy and to continue medications as previously ordered  - lisinopril (ZESTRIL) 20 MG tablet; Take 1 tablet (20 mg) by mouth daily.    Hearing loss, unspecified hearing loss type, unspecified laterality  Noted as baseline hearing loss even with aids in place.    Mild episode of recurrent major depressive disorder (H)  Stable and refilled per patient request as primary mental health provider is no longer available  - mirtazapine (REMERON) 15 MG tablet; take 1/2 tablet daily @ bedtime       - No identified additional risk factors other than previously addressed    Antiplatelet or Anticoagulation Medication Instructions   - Bleeding risk is low for this procedure (e.g. dental, skin, cataract).    Additional Medication Instructions  Take all scheduled medications on the day of surgery    Recommendation  Approval given to  proceed with proposed procedure, without further diagnostic evaluation.    Lakisha Weems is a 73 year old, presenting for the following:  Pre-Op Exam    HPI related to upcoming procedure: Cataract        10/4/2024   Pre-Op Questionnaire   Have you ever had a heart attack or stroke? No   Have you ever had surgery on your heart or blood vessels, such as a stent placement, a coronary artery bypass, or surgery on an artery in your head, neck, heart, or legs? No   Do you have chest pain with activity? No   Do you have a history of heart failure? No   Do you currently have a cold, bronchitis or symptoms of other infection? No   Do you have a cough, shortness of breath, or wheezing? No   Do you or anyone in your family have previous history of blood clots? No   Do you or does anyone in your family have a serious bleeding problem such as prolonged bleeding following surgeries or cuts? No   Have you ever had problems with anemia or been told to take iron pills? No   Have you had any abnormal blood loss such as black, tarry or bloody stools, or abnormal vaginal bleeding? No   Have you ever had a blood transfusion? No   Are you willing to have a blood transfusion if it is medically needed before, during, or after your surgery? Yes   Have you or any of your relatives ever had problems with anesthesia? No   Do you have sleep apnea, excessive snoring or daytime drowsiness? No   Do you have any artifical heart valves or other implanted medical devices like a pacemaker, defibrillator, or continuous glucose monitor? No   Do you have artificial joints? No   Are you allergic to latex? No        Health Care Directive  Patient does not have a Health Care Directive or Living Will: none    Status of Chronic Conditions:  See problem list for active medical problems.  Problems all longstanding and stable, except as noted/documented.  See ROS for pertinent symptoms related to these conditions.    Patient Active Problem List     Diagnosis Date Noted    Bilateral hearing loss, unspecified hearing loss type 03/08/2022     Priority: Medium    Essential hypertension, benign 02/27/2020     Priority: Medium    Osteoporosis without current pathological fracture, unspecified osteoporosis type 09/19/2019     Priority: Medium    Counseling regarding advanced directives 06/27/2017     Priority: Medium     Advance Care Planning 6/27/2017: ACP Review of Chart / Resources Provided:  Reviewed chart for advance care plan.  Faustina Ahmadi has been provided information and resources to begin or update their advance care plan.  Added by Yael Huizar            CARDIOVASCULAR SCREENING; LDL GOAL LESS THAN 130 06/15/2017     Priority: Medium    Mild episode of recurrent major depressive disorder (H) 04/21/2005     Priority: Medium    Other type of migraine 04/21/2005     Priority: Medium     Diagnosis updated by automated process. Provider to review and confirm.        Past Medical History:   Diagnosis Date    Depressive disorder     Currently being treated.    Depressive disorder, not elsewhere classified     Essential hypertension, benign 2/27/2020    Meningitis, unspecified(322.9)     Other forms of migraine, without mention of intractable migraine without mention of status migrainosus     Tobacco use disorder     dced 2003     Past Surgical History:   Procedure Laterality Date    COLONOSCOPY  7/25/2017    NO HISTORY OF SURGERY       Current Outpatient Medications   Medication Sig Dispense Refill    EXCEDRIN MIGRAINE 250-250-65 MG OR TABS 1 TABLET EVERY 4 HOURS AS NEEDED 0 0    lisinopril (ZESTRIL) 20 MG tablet TAKE 1 TABLET(20 MG) BY MOUTH DAILY 90 tablet 1    mirtazapine (REMERON) 15 MG tablet take 1/2 tablet daily @ bedtime 45 tablet 3       Allergies   Allergen Reactions    Honey Rash        Social History     Tobacco Use    Smoking status: Former     Current packs/day: 0.00     Types: Cigarettes    Smokeless tobacco: Never   Substance Use Topics     "Alcohol use: No       History   Drug Use No             Review of Systems  CONSTITUTIONAL: NEGATIVE for fever, chills, change in weight  EYES: NEGATIVE for vision changes or irritation  ENT/MOUTH: NEGATIVE for ear, mouth and throat problems  RESP: NEGATIVE for significant cough or SOB  CV: NEGATIVE for chest pain, palpitations or peripheral edema  GI: NEGATIVE for nausea, abdominal pain, heartburn, or change in bowel habits  : NEGATIVE for frequency, dysuria, or hematuria  MUSCULOSKELETAL: NEGATIVE for significant arthralgias or myalgia  NEURO: NEGATIVE for weakness, dizziness or paresthesias  ENDOCRINE: NEGATIVE for temperature intolerance, skin/hair changes  HEME: NEGATIVE for bleeding problems  PSYCHIATRIC: NEGATIVE for changes in mood or affect    Objective    /78   Pulse 95   Temp 98.3  F (36.8  C) (Temporal)   Resp 20   Ht 1.626 m (5' 4\")   Wt 54.1 kg (119 lb 4.8 oz)   LMP  (LMP Unknown)   SpO2 96%   BMI 20.48 kg/m     Estimated body mass index is 20.48 kg/m  as calculated from the following:    Height as of this encounter: 1.626 m (5' 4\").    Weight as of this encounter: 54.1 kg (119 lb 4.8 oz).  Physical Exam  GENERAL: alert and no distress  EYES: + cataract OU  HENT: ear canals and TM's normal, nose and mouth without ulcers or lesions, hearing aids in place.  Patient is extremely hard of hearing  NECK: no adenopathy, no asymmetry, masses, or scars  RESP: lungs clear to auscultation - no rales, rhonchi or wheezes  CV: regular rate and rhythm, normal S1 S2, no S3 or S4, no murmur, click or rub, no peripheral edema  ABDOMEN: soft, nontender, no hepatosplenomegaly, no masses and bowel sounds normal  NEURO: No focal changes  PSYCH: mentation appears normal, affect normal/bright    Recent Labs   Lab Test 06/18/24  0950   HGB 13.7         POTASSIUM 4.7   CR 0.72        Diagnostics  Labs pending at this time.  Results will be reviewed when available.   No EKG required for low risk " surgery (cataract, skin procedure, breast biopsy, etc).    Revised Cardiac Risk Index (RCRI)  The patient has the following serious cardiovascular risks for perioperative complications:   - No serious cardiac risks = 0 points     RCRI Interpretation: 1 point: Class II (low risk - 0.9% complication rate)         Signed Electronically by: Gurdeep Bardales MD  A copy of this evaluation report is provided to the requesting physician.

## 2024-10-09 ENCOUNTER — OFFICE VISIT (OUTPATIENT)
Dept: INTERNAL MEDICINE | Facility: CLINIC | Age: 74
End: 2024-10-09
Payer: COMMERCIAL

## 2024-10-09 VITALS
DIASTOLIC BLOOD PRESSURE: 78 MMHG | TEMPERATURE: 98.3 F | HEIGHT: 64 IN | BODY MASS INDEX: 20.37 KG/M2 | HEART RATE: 95 BPM | SYSTOLIC BLOOD PRESSURE: 136 MMHG | OXYGEN SATURATION: 96 % | WEIGHT: 119.3 LBS | RESPIRATION RATE: 20 BRPM

## 2024-10-09 DIAGNOSIS — F33.0 MILD EPISODE OF RECURRENT MAJOR DEPRESSIVE DISORDER (H): ICD-10-CM

## 2024-10-09 DIAGNOSIS — H91.90 HEARING LOSS, UNSPECIFIED HEARING LOSS TYPE, UNSPECIFIED LATERALITY: ICD-10-CM

## 2024-10-09 DIAGNOSIS — Z01.818 PRE-OPERATIVE GENERAL PHYSICAL EXAMINATION: Primary | ICD-10-CM

## 2024-10-09 DIAGNOSIS — I10 ESSENTIAL HYPERTENSION, BENIGN: ICD-10-CM

## 2024-10-09 DIAGNOSIS — H25.9 SENILE CATARACT, UNSPECIFIED AGE-RELATED CATARACT TYPE, UNSPECIFIED LATERALITY: ICD-10-CM

## 2024-10-09 LAB
ANION GAP SERPL CALCULATED.3IONS-SCNC: 16 MMOL/L (ref 7–15)
BUN SERPL-MCNC: 11.4 MG/DL (ref 8–23)
CALCIUM SERPL-MCNC: 10 MG/DL (ref 8.8–10.4)
CHLORIDE SERPL-SCNC: 98 MMOL/L (ref 98–107)
CREAT SERPL-MCNC: 0.7 MG/DL (ref 0.51–0.95)
EGFRCR SERPLBLD CKD-EPI 2021: >90 ML/MIN/1.73M2
ERYTHROCYTE [DISTWIDTH] IN BLOOD BY AUTOMATED COUNT: 12.7 % (ref 10–15)
GLUCOSE SERPL-MCNC: 120 MG/DL (ref 70–99)
HCO3 SERPL-SCNC: 21 MMOL/L (ref 22–29)
HCT VFR BLD AUTO: 39.5 % (ref 35–47)
HGB BLD-MCNC: 13.1 G/DL (ref 11.7–15.7)
MCH RBC QN AUTO: 31.9 PG (ref 26.5–33)
MCHC RBC AUTO-ENTMCNC: 33.2 G/DL (ref 31.5–36.5)
MCV RBC AUTO: 96 FL (ref 78–100)
PLATELET # BLD AUTO: 283 10E3/UL (ref 150–450)
POTASSIUM SERPL-SCNC: 4.8 MMOL/L (ref 3.4–5.3)
RBC # BLD AUTO: 4.11 10E6/UL (ref 3.8–5.2)
SODIUM SERPL-SCNC: 135 MMOL/L (ref 135–145)
WBC # BLD AUTO: 6.8 10E3/UL (ref 4–11)

## 2024-10-09 PROCEDURE — 85027 COMPLETE CBC AUTOMATED: CPT | Performed by: INTERNAL MEDICINE

## 2024-10-09 PROCEDURE — 36415 COLL VENOUS BLD VENIPUNCTURE: CPT | Performed by: INTERNAL MEDICINE

## 2024-10-09 PROCEDURE — 80048 BASIC METABOLIC PNL TOTAL CA: CPT | Performed by: INTERNAL MEDICINE

## 2024-10-09 PROCEDURE — 99214 OFFICE O/P EST MOD 30 MIN: CPT | Performed by: INTERNAL MEDICINE

## 2024-10-09 RX ORDER — MIRTAZAPINE 15 MG/1
TABLET, FILM COATED ORAL
Qty: 45 TABLET | Refills: 3 | Status: SHIPPED | OUTPATIENT
Start: 2024-10-09

## 2024-10-09 RX ORDER — LISINOPRIL 20 MG/1
20 TABLET ORAL DAILY
Qty: 90 TABLET | Refills: 3 | Status: SHIPPED | OUTPATIENT
Start: 2024-10-09

## 2025-03-22 ENCOUNTER — HEALTH MAINTENANCE LETTER (OUTPATIENT)
Age: 75
End: 2025-03-22

## 2025-06-09 ENCOUNTER — ANCILLARY PROCEDURE (OUTPATIENT)
Dept: MAMMOGRAPHY | Facility: CLINIC | Age: 75
End: 2025-06-09
Attending: INTERNAL MEDICINE
Payer: COMMERCIAL

## 2025-06-09 DIAGNOSIS — Z12.31 VISIT FOR SCREENING MAMMOGRAM: ICD-10-CM

## 2025-06-09 PROCEDURE — 77063 BREAST TOMOSYNTHESIS BI: CPT | Mod: TC | Performed by: RADIOLOGY

## 2025-06-09 PROCEDURE — 77067 SCR MAMMO BI INCL CAD: CPT | Mod: TC | Performed by: RADIOLOGY

## 2025-06-15 SDOH — HEALTH STABILITY: PHYSICAL HEALTH: ON AVERAGE, HOW MANY DAYS PER WEEK DO YOU ENGAGE IN MODERATE TO STRENUOUS EXERCISE (LIKE A BRISK WALK)?: 2 DAYS

## 2025-06-15 SDOH — HEALTH STABILITY: PHYSICAL HEALTH: ON AVERAGE, HOW MANY MINUTES DO YOU ENGAGE IN EXERCISE AT THIS LEVEL?: 10 MIN

## 2025-06-15 ASSESSMENT — SOCIAL DETERMINANTS OF HEALTH (SDOH): HOW OFTEN DO YOU GET TOGETHER WITH FRIENDS OR RELATIVES?: ONCE A WEEK

## 2025-06-19 ENCOUNTER — OFFICE VISIT (OUTPATIENT)
Dept: INTERNAL MEDICINE | Facility: CLINIC | Age: 75
End: 2025-06-19
Payer: COMMERCIAL

## 2025-06-19 VITALS
OXYGEN SATURATION: 98 % | SYSTOLIC BLOOD PRESSURE: 138 MMHG | HEART RATE: 90 BPM | BODY MASS INDEX: 19.07 KG/M2 | DIASTOLIC BLOOD PRESSURE: 76 MMHG | TEMPERATURE: 98.4 F | HEIGHT: 64 IN | RESPIRATION RATE: 21 BRPM | WEIGHT: 111.7 LBS

## 2025-06-19 DIAGNOSIS — Z13.6 CARDIOVASCULAR SCREENING; LDL GOAL LESS THAN 130: ICD-10-CM

## 2025-06-19 DIAGNOSIS — Z00.00 ENCOUNTER FOR SUBSEQUENT ANNUAL WELLNESS VISIT (AWV) IN MEDICARE PATIENT: Primary | ICD-10-CM

## 2025-06-19 DIAGNOSIS — M81.0 OSTEOPOROSIS WITHOUT CURRENT PATHOLOGICAL FRACTURE, UNSPECIFIED OSTEOPOROSIS TYPE: ICD-10-CM

## 2025-06-19 DIAGNOSIS — I10 ESSENTIAL HYPERTENSION, BENIGN: ICD-10-CM

## 2025-06-19 DIAGNOSIS — Z12.11 COLON CANCER SCREENING: ICD-10-CM

## 2025-06-19 DIAGNOSIS — Z12.31 VISIT FOR SCREENING MAMMOGRAM: ICD-10-CM

## 2025-06-19 DIAGNOSIS — Z78.0 ASYMPTOMATIC POSTMENOPAUSAL STATUS: ICD-10-CM

## 2025-06-19 DIAGNOSIS — F33.0 MILD EPISODE OF RECURRENT MAJOR DEPRESSIVE DISORDER: ICD-10-CM

## 2025-06-19 LAB
ALBUMIN SERPL BCG-MCNC: 4.7 G/DL (ref 3.5–5.2)
ALP SERPL-CCNC: 78 U/L (ref 40–150)
ALT SERPL W P-5'-P-CCNC: 19 U/L (ref 0–50)
ANION GAP SERPL CALCULATED.3IONS-SCNC: 14 MMOL/L (ref 7–15)
AST SERPL W P-5'-P-CCNC: 30 U/L (ref 0–45)
BILIRUB SERPL-MCNC: 0.3 MG/DL
BUN SERPL-MCNC: 11.6 MG/DL (ref 8–23)
CALCIUM SERPL-MCNC: 10.1 MG/DL (ref 8.8–10.4)
CHLORIDE SERPL-SCNC: 101 MMOL/L (ref 98–107)
CHOLEST SERPL-MCNC: 165 MG/DL
CREAT SERPL-MCNC: 0.73 MG/DL (ref 0.51–0.95)
EGFRCR SERPLBLD CKD-EPI 2021: 86 ML/MIN/1.73M2
ERYTHROCYTE [DISTWIDTH] IN BLOOD BY AUTOMATED COUNT: 12.5 % (ref 10–15)
FASTING STATUS PATIENT QL REPORTED: YES
FASTING STATUS PATIENT QL REPORTED: YES
GLUCOSE SERPL-MCNC: 124 MG/DL (ref 70–99)
HCO3 SERPL-SCNC: 22 MMOL/L (ref 22–29)
HCT VFR BLD AUTO: 38.5 % (ref 35–47)
HDLC SERPL-MCNC: 79 MG/DL
HGB BLD-MCNC: 13.1 G/DL (ref 11.7–15.7)
LDLC SERPL CALC-MCNC: 70 MG/DL
MCH RBC QN AUTO: 32.1 PG (ref 26.5–33)
MCHC RBC AUTO-ENTMCNC: 34 G/DL (ref 31.5–36.5)
MCV RBC AUTO: 94 FL (ref 78–100)
NONHDLC SERPL-MCNC: 86 MG/DL
PLATELET # BLD AUTO: 283 10E3/UL (ref 150–450)
POTASSIUM SERPL-SCNC: 5 MMOL/L (ref 3.4–5.3)
PROT SERPL-MCNC: 7.6 G/DL (ref 6.4–8.3)
RBC # BLD AUTO: 4.08 10E6/UL (ref 3.8–5.2)
SODIUM SERPL-SCNC: 137 MMOL/L (ref 135–145)
TRIGL SERPL-MCNC: 79 MG/DL
WBC # BLD AUTO: 6.4 10E3/UL (ref 4–11)

## 2025-06-19 RX ORDER — CYCLOSPORINE 0.5 MG/ML
1 EMULSION OPHTHALMIC 2 TIMES DAILY
COMMUNITY
Start: 2025-06-09

## 2025-06-19 ASSESSMENT — PATIENT HEALTH QUESTIONNAIRE - PHQ9
SUM OF ALL RESPONSES TO PHQ QUESTIONS 1-9: 1
10. IF YOU CHECKED OFF ANY PROBLEMS, HOW DIFFICULT HAVE THESE PROBLEMS MADE IT FOR YOU TO DO YOUR WORK, TAKE CARE OF THINGS AT HOME, OR GET ALONG WITH OTHER PEOPLE: NOT DIFFICULT AT ALL
SUM OF ALL RESPONSES TO PHQ QUESTIONS 1-9: 1

## 2025-06-19 ASSESSMENT — PAIN SCALES - GENERAL: PAINLEVEL_OUTOF10: NO PAIN (0)

## 2025-06-19 NOTE — PROGRESS NOTES
Preventive Care Visit  [facility]  Gurdeep Bardales MD, Internal Medicine  Jun 19, 2025      Assessment & Plan     Encounter for subsequent annual wellness visit (AWV) in Medicare patient  Discussed with patient need for potential updated routine vaccinations.  - Comprehensive metabolic panel; Future  - CBC with platelets; Future  - Lipid Profile; Future    Essential hypertension, benign  Stable on therapy continue with current medical management.  - Comprehensive metabolic panel; Future    CARDIOVASCULAR SCREENING; LDL GOAL LESS THAN 130  Labs ordered as fasting per routine and continuing with dietary change.  - Lipid Profile; Future    Mild episode of recurrent major depressive disorder  Stable per son.  Continuing with mirtazapine as ordered    Osteoporosis without current pathological fracture, unspecified osteoporosis type  Advised potential treatment options.  Advise updated bone density scan patient not interested in such.    Colon cancer screening  Suggest annual fit test.  Discussed benefit of routine screening colonoscopies updated  - Fecal colorectal cancer screen FIT; Future    Visit for screening mammogram  Mammography reviewed with patient    Asymptomatic postmenopausal status  Order placed for bone density scan and patient will determine if she wants to proceed with such.  Advised continue with calcium and vitamin D  - DX Bone Density; Future    Patient has been advised of split billing requirements and indicates understanding: Yes      Counseling  Appropriate preventive services were addressed with this patient via screening, questionnaire, or discussion as appropriate for fall prevention, nutrition, physical activity, Tobacco-use cessation, social engagement, weight loss and cognition.  Checklist reviewing preventive services available has been given to the patient.  Reviewed patient's diet, addressing concerns and/or questions.   She is at risk for lack of exercise and has been  provided with information to increase physical activity for the benefit of her well-being.   The patient was provided with written information regarding signs of hearing loss.       Subjective   Faustina is a 74 year old, presenting for the following:  Wellness Visit       HPI       Advance Care Planning  Discussed advance care planning with patient; however, patient declined at this time.        6/15/2025   General Health   How would you rate your overall physical health? Good   Feel stress (tense, anxious, or unable to sleep) Only a little   (!) STRESS CONCERN      6/15/2025   Nutrition   Diet: Regular (no restrictions)         6/15/2025   Exercise   Days per week of moderate/strenous exercise 2 days   Average minutes spent exercising at this level 10 min   (!) EXERCISE CONCERN      6/15/2025   Social Factors   Frequency of gathering with friends or relatives Once a week   Worry food won't last until get money to buy more No   Food not last or not have enough money for food? No   Do you have housing? (Housing is defined as stable permanent housing and does not include staying outside in a car, in a tent, in an abandoned building, in an overnight shelter, or couch-surfing.) Yes   Are you worried about losing your housing? No   Lack of transportation? No   Unable to get utilities (heat,electricity)? No         6/15/2025   Fall Risk   Fallen 2 or more times in the past year? No   Trouble with walking or balance? No          6/15/2025   Activities of Daily Living- Home Safety   Needs help with the following daily activites None of the above   Safety concerns in the home None of the above         6/15/2025   Dental   Dentist two times every year? Yes         6/15/2025   Hearing Screening   Hearing concerns? (!) I NEED TO ASK PEOPLE TO SPEAK UP OR REPEAT THEMSELVES.    (!) IT'S HARD TO FOLLOW A CONVERSATION IN A NOISY RESTAURANT OR CROWDED ROOM.    (!) TROUBLE UNDERSTANDING SOFT OR WHISPERED SPEECH.       Multiple values  from one day are sorted in reverse-chronological order         6/15/2025   Driving Risk Screening   Patient/family members have concerns about driving No         6/15/2025   General Alertness/Fatigue Screening   Have you been more tired than usual lately? No         6/15/2025   Urinary Incontinence Screening   Bothered by leaking urine in past 6 months No       Today's PHQ-9 Score:       6/19/2025     9:05 AM   PHQ-9 SCORE   PHQ-9 Total Score MyChart 1 (Minimal depression)   PHQ-9 Total Score 1        Patient-reported           6/15/2025   Substance Use   Alcohol more than 3/day or more than 7/wk Not Applicable   Do you have a current opioid prescription? No   How severe/bad is pain from 1 to 10? 4/10   Do you use any other substances recreationally? No     Social History     Tobacco Use    Smoking status: Former     Current packs/day: 0.00     Types: Cigarettes    Smokeless tobacco: Never   Vaping Use    Vaping status: Never Used   Substance Use Topics    Alcohol use: No    Drug use: No           6/9/2025   LAST FHS-7 RESULTS   1st degree relative breast or ovarian cancer No   Any relative bilateral breast cancer No   Any male have breast cancer No   Any ONE woman have BOTH breast AND ovarian cancer No   Any woman with breast cancer before 50yrs No   2 or more relatives with breast AND/OR ovarian cancer No   2 or more relatives with breast AND/OR bowel cancer No       Patient Health Questionnaire (Submitted on 6/19/2025)  If you checked off any problems, how difficult have these problems made it for you to do your work, take care of things at home, or get along with other people?: Not difficult at all  PHQ9 TOTAL SCORE: 1      ASCVD Risk   The 10-year ASCVD risk score (Maximilian DK, et al., 2019) is: 21.1%    Values used to calculate the score:      Age: 74 years      Sex: Female      Is Non- : No      Diabetic: No      Tobacco smoker: No      Systolic Blood Pressure: 138 mmHg      Is  BP treated: Yes      HDL Cholesterol: 77 mg/dL      Total Cholesterol: 166 mg/dL      Reviewed and updated as needed this visit by Provider                    Lab work is in process  Current providers sharing in care for this patient include:  Patient Care Team:  Gurdeep Bardales MD as PCP - General (Internal Medicine)  Gurdeep Bardales MD as Assigned PCP    The following health maintenance items are reviewed in Epic and correct as of today:  Health Maintenance   Topic Date Due    ANNUAL REVIEW OF HM ORDERS  03/08/2023    COVID-19 VACCINE (8 - 2024-25 season) 03/28/2025    MEDICARE ANNUAL WELLNESS VISIT  06/18/2025    BMP  10/09/2025    PHQ-9  12/19/2025    FALL RISK ASSESSMENT  06/19/2026    MAMMO SCREENING  06/09/2027    DIABETES SCREENING  10/09/2027    LIPID  06/18/2029    ADVANCE CARE PLANNING  06/18/2029    COLORECTAL CANCER SCREENING  10/10/2032    DEXA  04/18/2033    DTAP/TDAP/TD VACCINE (2 - Td or Tdap) 06/22/2034    HEPATITIS C SCREENING  Completed    DEPRESSION ACTION PLAN  Completed    MIGRAINE ACTION PLAN  Completed    INFLUENZA VACCINE  Completed    PNEUMOCOCCAL VACCINE 50+ YEARS  Completed    ZOSTER VACCINE  Completed    RSV VACCINE  Completed    HPV VACCINE  Aged Out    MENINGITIS VACCINE  Aged Out    LUNG CANCER SCREENING  Discontinued         Review of Systems  CONSTITUTIONAL: NEGATIVE for fever, chills, change in weight  INTEGUMENTARY/SKIN: NEGATIVE for worrisome rashes, moles or lesions  EYES: NEGATIVE for vision changes or irritation  ENT/MOUTH:  extremely [place].  RESP: NEGATIVE for significant cough or SOB  CV: NEGATIVE for chest pain, palpitations or peripheral edema  GI: NEGATIVE for nausea, abdominal pain, heartburn, or change in bowel habits  : NEGATIVE for frequency, dysuria, or hematuria  MUSCULOSKELETAL: NEGATIVE for significant arthralgias or myalgia  NEURO: NEGATIVE for weakness, dizziness or paresthesias  ENDOCRINE: NEGATIVE for temperature intolerance, skin/hair changes  HEME:  "NEGATIVE for bleeding problems  PSYCHIATRIC: NEGATIVE for changes in mood or affect     Objective    Exam  /76   Pulse 90   Temp 98.4  F (36.9  C) (Temporal)   Resp 21   Ht 1.626 m (5' 4\")   Wt 50.7 kg (111 lb 11.2 oz)   LMP  (LMP Unknown)   SpO2 98%   BMI 19.17 kg/m     Estimated body mass index is 19.17 kg/m  as calculated from the following:    Height as of this encounter: 1.626 m (5' 4\").    Weight as of this encounter: 50.7 kg (111 lb 11.2 oz).    Physical Exam  GENERAL: alert and no distress  EYES: Eyes grossly normal to inspection, PERRL and conjunctivae and sclerae normal  HENT:'s are otherwise clear.  There is a hearing aid noted in the left ear.  The patient is extremely hard of hearing.  NECK: no adenopathy, no asymmetry, masses, or scars  RESP: lungs clear to auscultation - no rales, rhonchi or wheezes  CV: regular rate and rhythm, normal S1 S2, no S3 or S4, no murmur, click or rub, no peripheral edema  MS: no gross musculoskeletal defects noted, no edema  NEURO: No focal changes  PSYCH: mentation appears normal, affect normal/bright         6/19/2025   Mini Cog   Clock Draw Score 2 Normal   3 Item Recall 3 objects recalled   Mini Cog Total Score 5       Signed Electronically by: Gurdeep Bardales MD    "

## 2025-06-19 NOTE — PATIENT INSTRUCTIONS
Patient Education   Preventive Care Advice   This is general advice given by our system to help you stay healthy. However, your care team may have specific advice just for you. Please talk to your care team about your preventive care needs.  Nutrition  Eat 5 or more servings of fruits and vegetables each day.  Try wheat bread, brown rice and whole grain pasta (instead of white bread, rice, and pasta).  Get enough calcium and vitamin D. Check the label on foods and aim for 100% of the RDA (recommended daily allowance).  Lifestyle  Exercise at least 150 minutes each week  (30 minutes a day, 5 days a week).  Do muscle strengthening activities 2 days a week. These help control your weight and prevent disease.  No smoking.  Wear sunscreen to prevent skin cancer.  Have a dental exam and cleaning every 6 months.  Yearly exams  See your health care team every year to talk about:  Any changes in your health.  Any medicines your care team has prescribed.  Preventive care, family planning, and ways to prevent chronic diseases.  Shots (vaccines)   HPV shots (up to age 26), if you've never had them before.  Hepatitis B shots (up to age 59), if you've never had them before.  COVID-19 shot: Get this shot when it's due.  Flu shot: Get a flu shot every year.  Tetanus shot: Get a tetanus shot every 10 years.  Pneumococcal, hepatitis A, and RSV shots: Ask your care team if you need these based on your risk.  Shingles shot (for age 50 and up)  General health tests  Diabetes screening:  Starting at age 35, Get screened for diabetes at least every 3 years.  If you are younger than age 35, ask your care team if you should be screened for diabetes.  Cholesterol test: At age 39, start having a cholesterol test every 5 years, or more often if advised.  Bone density scan (DEXA): At age 50, ask your care team if you should have this scan for osteoporosis (brittle bones).  Hepatitis C: Get tested at least once in your life.  STIs (sexually  transmitted infections)  Before age 24: Ask your care team if you should be screened for STIs.  After age 24: Get screened for STIs if you're at risk. You are at risk for STIs (including HIV) if:  You are sexually active with more than one person.  You don't use condoms every time.  You or a partner was diagnosed with a sexually transmitted infection.  If you are at risk for HIV, ask about PrEP medicine to prevent HIV.  Get tested for HIV at least once in your life, whether you are at risk for HIV or not.  Cancer screening tests  Cervical cancer screening: If you have a cervix, begin getting regular cervical cancer screening tests starting at age 21.  Breast cancer scan (mammogram): If you've ever had breasts, begin having regular mammograms starting at age 40. This is a scan to check for breast cancer.  Colon cancer screening: It is important to start screening for colon cancer at age 45.  Have a colonoscopy test every 10 years (or more often if you're at risk) Or, ask your provider about stool tests like a FIT test every year or Cologuard test every 3 years.  To learn more about your testing options, visit:   .  For help making a decision, visit:   https://bit.ly/na58044.  Prostate cancer screening test: If you have a prostate, ask your care team if a prostate cancer screening test (PSA) at age 55 is right for you.  Lung cancer screening: If you are a current or former smoker ages 50 to 80, ask your care team if ongoing lung cancer screenings are right for you.  For informational purposes only. Not to replace the advice of your health care provider. Copyright   2023 [facility] [employer]. All rights reserved. Clinically reviewed by the [facility]. TSO3 [id number] - REV 01/24.  Hearing Loss: Care Instructions  Overview     Hearing loss is a sudden or slow decrease in how well you hear. It can range from slight to profound. Permanent hearing loss can occur with aging. It also can  happen when you are exposed long-term to loud noise. Examples include listening to loud music, riding motorcycles, or being around other loud machines.  Hearing loss can affect your work and home life. It can make you feel lonely or depressed. You may feel that you have lost your independence. But hearing aids and other devices can help you hear better and feel connected to others.  Follow-up care is a key part of your treatment and safety. Be sure to make and go to all appointments, and call your doctor if you are having problems. It's also a good idea to know your test results and keep a list of the medicines you take.  How can you care for yourself at home?  Avoid loud noises whenever possible. This helps keep your hearing from getting worse.  Always wear hearing protection around loud noises.  Wear a hearing aid as directed.  A professional can help you pick a hearing aid that will work best for you.  You can also get hearing aids over the counter for mild to moderate hearing loss.  Have hearing tests as your doctor suggests. They can show whether your hearing has changed. Your hearing aid may need to be adjusted.  Use other devices as needed. These may include:  Telephone amplifiers and hearing aids that can connect to a television, stereo, radio, or microphone.  Devices that use lights or vibrations. These alert you to the doorbell, a ringing telephone, or a baby monitor.  Television closed-captioning. This shows the words at the bottom of the screen. Most new TVs can do this.  TTY (text telephone). This lets you type messages back and forth on the telephone instead of talking or listening. These devices are also called TDD. When messages are typed on the keyboard, they are sent over the phone line to a receiving TTY. The message is shown on a monitor.  Use text messaging, social media, and email if it is hard for you to communicate by telephone.  Try to learn a listening technique called speechreading. It is  "not lipreading. You pay attention to people's gestures, expressions, posture, and tone of voice. These clues can help you understand what a person is saying. Face the person you are talking to, and have them face you. Make sure the lighting is good. You need to see the other person's face clearly.  Think about counseling if you need help to adjust to your hearing loss.  When should you call for help?  Watch closely for changes in your health, and be sure to contact your doctor if:    You think your hearing is getting worse.     You have new symptoms, such as dizziness or nausea.   Where can you learn more?  Go to https://www.[employer].net/patiented  Enter R798 in the search box to learn more about \"Hearing Loss: Care Instructions.\"  Current as of: October 27, 2024  Content Version: 14.5    2627-9444 [employer].   Care instructions adapted under license by your healthcare professional. If you have questions about a medical condition or this instruction, always ask your healthcare professional. [employer] disclaims any warranty or liability for your use of this information.       "

## 2025-06-20 ENCOUNTER — RESULTS FOLLOW-UP (OUTPATIENT)
Dept: INTERNAL MEDICINE | Facility: CLINIC | Age: 75
End: 2025-06-20

## 2025-06-24 LAB — HEMOCCULT STL QL IA: NEGATIVE

## 2025-07-02 ENCOUNTER — TRANSFERRED RECORDS (OUTPATIENT)
Dept: HEALTH INFORMATION MANAGEMENT | Facility: CLINIC | Age: 75
End: 2025-07-02
Payer: COMMERCIAL